# Patient Record
Sex: FEMALE | Race: WHITE | NOT HISPANIC OR LATINO | Employment: FULL TIME | URBAN - NONMETROPOLITAN AREA
[De-identification: names, ages, dates, MRNs, and addresses within clinical notes are randomized per-mention and may not be internally consistent; named-entity substitution may affect disease eponyms.]

---

## 2017-05-12 ENCOUNTER — OFFICE VISIT (OUTPATIENT)
Dept: SURGERY | Facility: CLINIC | Age: 32
End: 2017-05-12

## 2017-05-12 VITALS
BODY MASS INDEX: 33.32 KG/M2 | WEIGHT: 200 LBS | HEIGHT: 65 IN | DIASTOLIC BLOOD PRESSURE: 80 MMHG | SYSTOLIC BLOOD PRESSURE: 120 MMHG

## 2017-05-12 DIAGNOSIS — R92.8 ABNORMAL FINDING ON BREAST IMAGING: Primary | ICD-10-CM

## 2017-05-12 PROCEDURE — 99203 OFFICE O/P NEW LOW 30 MIN: CPT | Performed by: SURGERY

## 2017-05-12 RX ORDER — IBUPROFEN 800 MG/1
TABLET ORAL
Refills: 2 | COMMUNITY
Start: 2017-03-31 | End: 2019-02-28

## 2019-02-28 ENCOUNTER — HOSPITAL ENCOUNTER (EMERGENCY)
Facility: HOSPITAL | Age: 34
Discharge: HOME | End: 2019-02-28
Attending: EMERGENCY MEDICINE | Admitting: EMERGENCY MEDICINE

## 2019-02-28 ENCOUNTER — APPOINTMENT (OUTPATIENT)
Dept: ULTRASOUND IMAGING | Facility: HOSPITAL | Age: 34
End: 2019-02-28

## 2019-02-28 VITALS
WEIGHT: 200 LBS | OXYGEN SATURATION: 96 % | DIASTOLIC BLOOD PRESSURE: 74 MMHG | BODY MASS INDEX: 31.39 KG/M2 | TEMPERATURE: 98.2 F | RESPIRATION RATE: 18 BRPM | HEART RATE: 73 BPM | HEIGHT: 67 IN | SYSTOLIC BLOOD PRESSURE: 127 MMHG

## 2019-02-28 DIAGNOSIS — R10.2 SUPRAPUBIC PAIN: ICD-10-CM

## 2019-02-28 DIAGNOSIS — R93.89 ENDOMETRIAL THICKENING ON ULTRASOUND: Primary | ICD-10-CM

## 2019-02-28 DIAGNOSIS — N83.202 CYST OF LEFT OVARY: ICD-10-CM

## 2019-02-28 LAB
ALBUMIN SERPL-MCNC: 4.3 G/DL (ref 3.4–4.8)
ALBUMIN/GLOB SERPL: 1.3 G/DL (ref 1.1–1.8)
ALP SERPL-CCNC: 94 U/L (ref 38–126)
ALT SERPL W P-5'-P-CCNC: 20 U/L (ref 9–52)
ANION GAP SERPL CALCULATED.3IONS-SCNC: 11 MMOL/L (ref 5–15)
AST SERPL-CCNC: 22 U/L (ref 14–36)
B-HCG UR QL: NEGATIVE
BACTERIA UR QL AUTO: ABNORMAL /HPF
BASOPHILS # BLD AUTO: 0.06 10*3/MM3 (ref 0–0.2)
BASOPHILS NFR BLD AUTO: 0.5 % (ref 0–1.5)
BILIRUB SERPL-MCNC: 0.5 MG/DL (ref 0.2–1.3)
BILIRUB UR QL STRIP: NEGATIVE
BUN BLD-MCNC: 8 MG/DL (ref 7–21)
BUN/CREAT SERPL: 10.8 (ref 7–25)
CALCIUM SPEC-SCNC: 9.5 MG/DL (ref 8.4–10.2)
CHLORIDE SERPL-SCNC: 103 MMOL/L (ref 95–110)
CLARITY UR: ABNORMAL
CO2 SERPL-SCNC: 24 MMOL/L (ref 22–31)
COLOR UR: YELLOW
CREAT BLD-MCNC: 0.74 MG/DL (ref 0.5–1)
DEPRECATED RDW RBC AUTO: 36.5 FL (ref 37–54)
EOSINOPHIL # BLD AUTO: 0.16 10*3/MM3 (ref 0–0.4)
EOSINOPHIL NFR BLD AUTO: 1.4 % (ref 0.3–6.2)
ERYTHROCYTE [DISTWIDTH] IN BLOOD BY AUTOMATED COUNT: 12.5 % (ref 12.3–15.4)
GFR SERPL CREATININE-BSD FRML MDRD: 91 ML/MIN/1.73 (ref 64–149)
GLOBULIN UR ELPH-MCNC: 3.4 GM/DL (ref 2.3–3.5)
GLUCOSE BLD-MCNC: 95 MG/DL (ref 60–100)
GLUCOSE UR STRIP-MCNC: NEGATIVE MG/DL
HCT VFR BLD AUTO: 41.1 % (ref 34–46.6)
HGB BLD-MCNC: 14.5 G/DL (ref 12–15.9)
HGB UR QL STRIP.AUTO: ABNORMAL
HOLD SPECIMEN: NORMAL
HYALINE CASTS UR QL AUTO: ABNORMAL /LPF
IMM GRANULOCYTES # BLD AUTO: 0.06 10*3/MM3 (ref 0–0.05)
IMM GRANULOCYTES NFR BLD AUTO: 0.5 % (ref 0–0.5)
KETONES UR QL STRIP: NEGATIVE
LEUKOCYTE ESTERASE UR QL STRIP.AUTO: NEGATIVE
LYMPHOCYTES # BLD AUTO: 2.34 10*3/MM3 (ref 0.7–3.1)
LYMPHOCYTES NFR BLD AUTO: 20.2 % (ref 19.6–45.3)
MCH RBC QN AUTO: 28.6 PG (ref 26.6–33)
MCHC RBC AUTO-ENTMCNC: 35.3 G/DL (ref 31.5–35.7)
MCV RBC AUTO: 81.1 FL (ref 79–97)
MONOCYTES # BLD AUTO: 0.67 10*3/MM3 (ref 0.1–0.9)
MONOCYTES NFR BLD AUTO: 5.8 % (ref 5–12)
NEUTROPHILS # BLD AUTO: 8.29 10*3/MM3 (ref 1.4–7)
NEUTROPHILS NFR BLD AUTO: 71.6 % (ref 42.7–76)
NITRITE UR QL STRIP: NEGATIVE
NRBC BLD AUTO-RTO: 0 /100 WBC (ref 0–0)
PH UR STRIP.AUTO: 6.5 [PH] (ref 5–9)
PLATELET # BLD AUTO: 303 10*3/MM3 (ref 140–450)
PMV BLD AUTO: 9.6 FL (ref 6–12)
POTASSIUM BLD-SCNC: 3.7 MMOL/L (ref 3.5–5.1)
PROT SERPL-MCNC: 7.7 G/DL (ref 6.3–8.6)
PROT UR QL STRIP: ABNORMAL
RBC # BLD AUTO: 5.07 10*6/MM3 (ref 3.77–5.28)
RBC # UR: ABNORMAL /HPF
REF LAB TEST METHOD: ABNORMAL
SODIUM BLD-SCNC: 138 MMOL/L (ref 137–145)
SP GR UR STRIP: 1.01 (ref 1–1.03)
SQUAMOUS #/AREA URNS HPF: ABNORMAL /HPF
UROBILINOGEN UR QL STRIP: ABNORMAL
WBC NRBC COR # BLD: 11.58 10*3/MM3 (ref 3.4–10.8)
WBC UR QL AUTO: ABNORMAL /HPF
WHOLE BLOOD HOLD SPECIMEN: NORMAL

## 2019-02-28 PROCEDURE — 81001 URINALYSIS AUTO W/SCOPE: CPT | Performed by: PHYSICIAN ASSISTANT

## 2019-02-28 PROCEDURE — 80053 COMPREHEN METABOLIC PANEL: CPT | Performed by: PHYSICIAN ASSISTANT

## 2019-02-28 PROCEDURE — 81025 URINE PREGNANCY TEST: CPT | Performed by: PHYSICIAN ASSISTANT

## 2019-02-28 PROCEDURE — 99283 EMERGENCY DEPT VISIT LOW MDM: CPT

## 2019-02-28 PROCEDURE — 85025 COMPLETE CBC W/AUTO DIFF WBC: CPT | Performed by: PHYSICIAN ASSISTANT

## 2019-02-28 PROCEDURE — 96360 HYDRATION IV INFUSION INIT: CPT

## 2019-02-28 PROCEDURE — 76830 TRANSVAGINAL US NON-OB: CPT

## 2019-02-28 RX ORDER — KETOROLAC TROMETHAMINE 10 MG/1
10 TABLET, FILM COATED ORAL EVERY 6 HOURS PRN
Qty: 20 TABLET | Refills: 0 | Status: SHIPPED | OUTPATIENT
Start: 2019-02-28 | End: 2019-03-05

## 2019-02-28 RX ORDER — KETOROLAC TROMETHAMINE 10 MG/1
20 TABLET, FILM COATED ORAL ONCE
Status: COMPLETED | OUTPATIENT
Start: 2019-02-28 | End: 2019-02-28

## 2019-02-28 RX ADMIN — KETOROLAC TROMETHAMINE 20 MG: 10 TABLET, FILM COATED ORAL at 10:32

## 2019-02-28 RX ADMIN — SODIUM CHLORIDE 1000 ML: 900 INJECTION, SOLUTION INTRAVENOUS at 10:32

## 2019-03-11 ENCOUNTER — OFFICE VISIT (OUTPATIENT)
Dept: OBSTETRICS AND GYNECOLOGY | Facility: CLINIC | Age: 34
End: 2019-03-11

## 2019-03-11 VITALS
HEIGHT: 64 IN | BODY MASS INDEX: 43.74 KG/M2 | WEIGHT: 256.2 LBS | SYSTOLIC BLOOD PRESSURE: 128 MMHG | DIASTOLIC BLOOD PRESSURE: 84 MMHG

## 2019-03-11 DIAGNOSIS — R93.89 ABNORMAL PELVIC ULTRASOUND: ICD-10-CM

## 2019-03-11 DIAGNOSIS — E66.01 MORBID OBESITY (HCC): ICD-10-CM

## 2019-03-11 DIAGNOSIS — N92.0 MENORRHAGIA WITH REGULAR CYCLE: Primary | ICD-10-CM

## 2019-03-11 DIAGNOSIS — Z98.890 HISTORY OF ENDOMETRIAL ABLATION: ICD-10-CM

## 2019-03-11 PROCEDURE — 99213 OFFICE O/P EST LOW 20 MIN: CPT | Performed by: OBSTETRICS & GYNECOLOGY

## 2019-03-11 NOTE — PROGRESS NOTES
Chief Complaint   Patient presents with   • Follow-up     emergency room follow up     Narda Sheehan is a 33 y.o. year old No obstetric history on file..  Patient's last menstrual period was 02/25/2019 (exact date).  She presents with a chief complaint of follow up from ER secondary to abnormal pelvic ultrasound showing . Patient went to ER for indication of Left suprapubic pain which has resolved spontaneously.  Patient states she has never been to see a PCP but she did have hypertension and issues with a rapid heart rate.    US Non-ob Transvaginal [[id number]] (Order [id number])   Order   Status: Final result   Study Result     EXAMINATION:  Ultrasound, pelvic     CLINICAL INDICATION / HISTORY:  LEFT SUPRAPUBIC PAIN     COMPARISON:  none     TECHNIQUE:  Transvaginal     FULL RESULTS / FINDINGS:        Transvaginal:    - uterus: normal size, measures 9.18 x 5.13 x 5.92 cm  Uterine fundus oval anechoic structures with good through  acoustic transmission which measures 5.7 mm in greatest diameter.                   endometrial canal: In the region of the cervix,  there is a small focus of endometrial thickening and  hypoechogenicity which measures 0.89 x 0.56 cm. Otherwise the  endometrium is unremarkable with maximum width of 0.6 cm.    - ovaries:        - right:  normal size, measuring 3.1 x 1.7 x 2.2 cm                    echotexture:   physiologic , no complex / solid  mass(es), normal color-flow blood flow        - left:    normal size, measuring 2.6 x 2.2 x 1.9 cm                     echotexture:   physiologic , no complex /  solid mass(es), normal color-flow blood flow    - cul-de-sac: no/physiologic amount of free fluid     Misc:     IMPRESSION:  CONCLUSION:    1.  In the region of the cervix, there is a small focus of  endometrial thickening and hypoechogenicity which measures 0.89 x  0.56 cm. This is of uncertain etiology. This may represent normal  endometrial changes from menstrual cycle versus retained  "products  of conception versus intrauterine blood clot. Less likely  etiologies could be early cervical carcinoma. Recommend GYN  consultation and consideration of biopsy.  2.  Uterine fundus oval anechoic structures with good through  acoustic transmission which measures 5.7 mm in greatest diameter  consistent with small cyst in this region. This would be  suspicious for adenomyosis.  3.  Otherwise unremarkable pelvic ultrasound.     Electronically signed by:  Shankar Amaya MD  2019 10:57 AM CST  Workstation: [id number]                                                             l                                        Past Medical History:   Diagnosis Date   • Acute pain    • Acute pharyngitis    • Aphthous ulcer of mouth    • Glossitis    • Headache    • Streptococcal sore throat    • Tension-type headache    • Upper respiratory infection      PSH:  x 2, tubal ligation with last . Endometrial ablation in 2013  No current outpatient medications on file.  No Known Allergies  Social History    Tobacco Use      Smoking status: Never Smoker      Smokeless tobacco: Never Used    Review of Systems  Negative except for heavy monthly menses with several days of pelvic pain at end of menses and a few days afterwards x 2 months.    /84   Ht 162.6 cm (64\")   Wt 116 kg (256 lb 3.2 oz)   LMP 2019 (Exact Date)   BMI 43.98 kg/m²     General:  well developed; well nourished  no acute distress  appears stated age  obese - Body mass index is 43.98 kg/m².   Thyroid: not examined   Lungs:  breathing is unlabored   Heart:  Not performed.   Breasts:  Not performed.   Abdomen: soft, non-tender; no masses   Pelvis: Not performed.     Lab Review   No data reviewed      Imaging   Pelvic ultrasound report    Assessment     1. Morbid obesity  2.  History Chronic hypertension, with no medical followup  3.  History of episodes of rapid heart rate, no medical followup  4.  Menorrhagia  5.  Abnormal endometrial " and cervical canal linings; ? polyps       Plan   1. Medical clearance for surgery  2. Schedule for hysteroscopic D&C with cervical curettage  3. The options presented to the patient were:Consider a diagnostic hysteroscopy and Consider a D&C, no treatment.  4. Patient to return to clinic with me for pre-op appointment and labs for menorrhagia workup.         This note was electronically signed.    Evita Dominique MD  March 11, 2019

## 2019-03-12 DIAGNOSIS — R00.0 RAPID HEART RATE: Primary | ICD-10-CM

## 2019-03-20 ENCOUNTER — OFFICE VISIT (OUTPATIENT)
Dept: FAMILY MEDICINE CLINIC | Facility: CLINIC | Age: 34
End: 2019-03-20

## 2019-03-20 VITALS
OXYGEN SATURATION: 98 % | SYSTOLIC BLOOD PRESSURE: 140 MMHG | BODY MASS INDEX: 43.6 KG/M2 | HEIGHT: 64 IN | DIASTOLIC BLOOD PRESSURE: 90 MMHG | WEIGHT: 255.4 LBS | TEMPERATURE: 98.9 F | HEART RATE: 79 BPM

## 2019-03-20 DIAGNOSIS — Z00.00 ENCOUNTER FOR SCREENING AND PREVENTATIVE CARE: ICD-10-CM

## 2019-03-20 DIAGNOSIS — R03.0 ELEVATED BP WITHOUT DIAGNOSIS OF HYPERTENSION: ICD-10-CM

## 2019-03-20 DIAGNOSIS — E66.01 CLASS 3 SEVERE OBESITY WITH BODY MASS INDEX (BMI) OF 40.0 TO 44.9 IN ADULT, UNSPECIFIED OBESITY TYPE, UNSPECIFIED WHETHER SERIOUS COMORBIDITY PRESENT (HCC): ICD-10-CM

## 2019-03-20 DIAGNOSIS — Z76.89 ENCOUNTER TO ESTABLISH CARE: Primary | ICD-10-CM

## 2019-03-20 PROCEDURE — 99203 OFFICE O/P NEW LOW 30 MIN: CPT | Performed by: NURSE PRACTITIONER

## 2019-03-20 NOTE — PROGRESS NOTES
Subjective   Narda Sheehan is a 33 y.o. female.     Ms. Sheehan is a 33-year-old female presents today to establish care for the management of obesity and history of elevated blood pressure.  She denies chest pain, shortness of breath palpitations.  She states she has had intermittent blood pressure in the past but in the last 6 months her blood pressure has been consistently less than 140/90.  She states she has had to see a cardiologist when she was a teenager related to tachycardia.  She has had no follow-up since that time.  She has not been on any blood pressure medication.  She is scheduled for a cardiology evaluation so that she can be cleared for an upcoming OB/GYN procedure.  She has no acute complaints today.         The following portions of the patient's history were reviewed and updated as appropriate: allergies, current medications, past family history, past medical history, past social history, past surgical history and problem list.    Review of Systems   Constitutional: Negative for activity change, appetite change, chills, diaphoresis, fatigue, fever, unexpected weight gain and unexpected weight loss.   HENT: Negative for congestion, sore throat, trouble swallowing and voice change.    Eyes: Negative for blurred vision, double vision, photophobia, pain and visual disturbance.   Respiratory: Negative for cough, chest tightness, shortness of breath and wheezing.    Cardiovascular: Negative for chest pain, palpitations and leg swelling.   Gastrointestinal: Negative for abdominal distention, abdominal pain, anal bleeding, blood in stool, constipation, diarrhea, nausea, vomiting, GERD and indigestion.   Endocrine: Negative for cold intolerance, heat intolerance, polydipsia, polyphagia and polyuria.   Genitourinary: Negative for dysuria, frequency, hematuria and urgency.   Musculoskeletal: Negative for arthralgias and myalgias.   Skin: Negative for rash.   Allergic/Immunologic: Negative.    Neurological:  Negative for dizziness, syncope, weakness, light-headedness and headache.   Hematological: Negative.    Psychiatric/Behavioral: The patient is not nervous/anxious.        Objective   Physical Exam   Constitutional: She is oriented to person, place, and time. She appears well-developed and well-nourished. No distress.   HENT:   Head: Normocephalic and atraumatic.   Right Ear: External ear normal.   Left Ear: External ear normal.   Nose: Nose normal.   Mouth/Throat: Oropharynx is clear and moist.   Eyes: Conjunctivae and EOM are normal. Pupils are equal, round, and reactive to light.   Neck: Normal range of motion. Neck supple. No tracheal deviation present. No thyromegaly present.   Cardiovascular: Normal rate, regular rhythm, normal heart sounds and intact distal pulses. Exam reveals no gallop and no friction rub.   No murmur heard.  Pulmonary/Chest: Effort normal and breath sounds normal. No stridor. No respiratory distress. She has no wheezes. She has no rales.   Abdominal: Soft. Bowel sounds are normal. She exhibits no distension and no mass. There is no tenderness. There is no rebound and no guarding. No hernia.   Musculoskeletal: Normal range of motion. She exhibits no edema or tenderness.   Lymphadenopathy:     She has no cervical adenopathy.   Neurological: She is alert and oriented to person, place, and time. No cranial nerve deficit. Coordination normal.   Skin: Skin is warm and dry. No rash noted. She is not diaphoretic. No erythema. No pallor.   Psychiatric: She has a normal mood and affect. Her behavior is normal. Judgment and thought content normal.   Nursing note and vitals reviewed.        Assessment/Plan   Narda was seen today for establish care.    Diagnoses and all orders for this visit:    Encounter to establish care    Encounter for screening and preventative care  -     Hemoglobin A1c; Future  -     Comprehensive Metabolic Panel; Future  -     CBC & Differential; Future  -     TSH; Future  -      Lipid Panel; Future    Elevated BP without diagnosis of hypertension    Class 3 severe obesity with body mass index (BMI) of 40.0 to 44.9 in adult, unspecified obesity type, unspecified whether serious comorbidity present (CMS/[facility])    1.  Elevated BP without a diagnosis of hypertension-blood pressure today 140/90.  She has no chest pain, shortness of breath or palpitations.  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  Instructed patient to continue to monitor home blood pressure readings and log.  She was instructed to bring these readings to her cardiology appointment.  Will defer any possible medication until she has been evaluated by cardiology.    2.  Obesity-discussed low-carb high-protein diet and exercise 3-5 times a week for 30 minutes.  Patient is also interested in possible medication to assist in weight loss.  We both agreed that this would probably be best addressed after she had her OB/GYN procedure and fully recovered.  She is in agreement with this plan of care.  More than 10 minutes spent counseling on diet and exercise.    3.  Health maintenance-routine labs ordered.  Will call with results.    4.  Follow-up in 3 months or sooner if needed.            This document has been electronically signed by LOR Jerome on March 20, 2019 12:28 PM

## 2019-03-22 ENCOUNTER — LAB (OUTPATIENT)
Dept: LAB | Facility: HOSPITAL | Age: 34
End: 2019-03-22

## 2019-03-22 DIAGNOSIS — Z00.00 ENCOUNTER FOR SCREENING AND PREVENTATIVE CARE: ICD-10-CM

## 2019-03-22 LAB
ALBUMIN SERPL-MCNC: 4.1 G/DL (ref 3.4–4.8)
ALBUMIN/GLOB SERPL: 1.2 G/DL (ref 1.1–1.8)
ALP SERPL-CCNC: 74 U/L (ref 38–126)
ALT SERPL W P-5'-P-CCNC: 25 U/L (ref 9–52)
ANION GAP SERPL CALCULATED.3IONS-SCNC: 9 MMOL/L (ref 5–15)
ARTICHOKE IGE QN: 136 MG/DL (ref 1–129)
AST SERPL-CCNC: 45 U/L (ref 14–36)
BASOPHILS # BLD AUTO: 0.07 10*3/MM3 (ref 0–0.2)
BASOPHILS NFR BLD AUTO: 0.7 % (ref 0–1.5)
BILIRUB SERPL-MCNC: 0.5 MG/DL (ref 0.2–1.3)
BUN BLD-MCNC: 12 MG/DL (ref 7–21)
BUN/CREAT SERPL: 16.4 (ref 7–25)
CALCIUM SPEC-SCNC: 9.2 MG/DL (ref 8.4–10.2)
CHLORIDE SERPL-SCNC: 101 MMOL/L (ref 95–110)
CHOLEST SERPL-MCNC: 221 MG/DL (ref 0–199)
CO2 SERPL-SCNC: 27 MMOL/L (ref 22–31)
CREAT BLD-MCNC: 0.73 MG/DL (ref 0.5–1)
DEPRECATED RDW RBC AUTO: 37.6 FL (ref 37–54)
EOSINOPHIL # BLD AUTO: 0.24 10*3/MM3 (ref 0–0.4)
EOSINOPHIL NFR BLD AUTO: 2.4 % (ref 0.3–6.2)
ERYTHROCYTE [DISTWIDTH] IN BLOOD BY AUTOMATED COUNT: 12.8 % (ref 12.3–15.4)
GFR SERPL CREATININE-BSD FRML MDRD: 92 ML/MIN/1.73 (ref 64–149)
GLOBULIN UR ELPH-MCNC: 3.5 GM/DL (ref 2.3–3.5)
GLUCOSE BLD-MCNC: 93 MG/DL (ref 60–100)
HBA1C MFR BLD: 5.5 % (ref 4–5.6)
HCT VFR BLD AUTO: 42 % (ref 34–46.6)
HDLC SERPL-MCNC: 38 MG/DL (ref 60–200)
HGB BLD-MCNC: 14.2 G/DL (ref 12–15.9)
IMM GRANULOCYTES # BLD AUTO: 0.07 10*3/MM3 (ref 0–0.05)
IMM GRANULOCYTES NFR BLD AUTO: 0.7 % (ref 0–0.5)
LDLC/HDLC SERPL: 3.81 {RATIO} (ref 0–3.22)
LYMPHOCYTES # BLD AUTO: 3.51 10*3/MM3 (ref 0.7–3.1)
LYMPHOCYTES NFR BLD AUTO: 34.5 % (ref 19.6–45.3)
MCH RBC QN AUTO: 27.8 PG (ref 26.6–33)
MCHC RBC AUTO-ENTMCNC: 33.8 G/DL (ref 31.5–35.7)
MCV RBC AUTO: 82.2 FL (ref 79–97)
MONOCYTES # BLD AUTO: 0.68 10*3/MM3 (ref 0.1–0.9)
MONOCYTES NFR BLD AUTO: 6.7 % (ref 5–12)
NEUTROPHILS # BLD AUTO: 5.61 10*3/MM3 (ref 1.4–7)
NEUTROPHILS NFR BLD AUTO: 55 % (ref 42.7–76)
NRBC BLD AUTO-RTO: 0 /100 WBC (ref 0–0)
PLATELET # BLD AUTO: 301 10*3/MM3 (ref 140–450)
PMV BLD AUTO: 9.8 FL (ref 6–12)
POTASSIUM BLD-SCNC: 3.9 MMOL/L (ref 3.5–5.1)
PROT SERPL-MCNC: 7.6 G/DL (ref 6.3–8.6)
RBC # BLD AUTO: 5.11 10*6/MM3 (ref 3.77–5.28)
SODIUM BLD-SCNC: 137 MMOL/L (ref 137–145)
TRIGL SERPL-MCNC: 191 MG/DL (ref 20–199)
TSH SERPL DL<=0.05 MIU/L-ACNC: 1.62 MIU/ML (ref 0.46–4.68)
WBC NRBC COR # BLD: 10.18 10*3/MM3 (ref 3.4–10.8)

## 2019-03-22 PROCEDURE — 84443 ASSAY THYROID STIM HORMONE: CPT

## 2019-03-22 PROCEDURE — 80053 COMPREHEN METABOLIC PANEL: CPT

## 2019-03-22 PROCEDURE — 80061 LIPID PANEL: CPT

## 2019-03-22 PROCEDURE — 85025 COMPLETE CBC W/AUTO DIFF WBC: CPT

## 2019-03-22 PROCEDURE — 83036 HEMOGLOBIN GLYCOSYLATED A1C: CPT

## 2019-03-22 PROCEDURE — 36415 COLL VENOUS BLD VENIPUNCTURE: CPT

## 2019-03-22 NOTE — PROGRESS NOTES
Total cholesterol and LDL slightly elevated.  HDL slightly low.  Instruct in low-fat diet and exercise.  Follow-up as scheduled.

## 2019-04-08 DIAGNOSIS — R00.0 RAPID HEART RATE: Primary | ICD-10-CM

## 2019-04-09 ENCOUNTER — OFFICE VISIT (OUTPATIENT)
Dept: CARDIOLOGY | Facility: CLINIC | Age: 34
End: 2019-04-09

## 2019-04-09 VITALS
HEIGHT: 64 IN | BODY MASS INDEX: 43.54 KG/M2 | HEART RATE: 80 BPM | SYSTOLIC BLOOD PRESSURE: 132 MMHG | WEIGHT: 255 LBS | DIASTOLIC BLOOD PRESSURE: 78 MMHG

## 2019-04-09 DIAGNOSIS — I34.1 MVP (MITRAL VALVE PROLAPSE): ICD-10-CM

## 2019-04-09 DIAGNOSIS — R00.2 PALPITATIONS: ICD-10-CM

## 2019-04-09 DIAGNOSIS — Z01.810 PREOP CARDIOVASCULAR EXAM: Primary | ICD-10-CM

## 2019-04-09 PROCEDURE — 93000 ELECTROCARDIOGRAM COMPLETE: CPT | Performed by: INTERNAL MEDICINE

## 2019-04-09 PROCEDURE — 99204 OFFICE O/P NEW MOD 45 MIN: CPT | Performed by: INTERNAL MEDICINE

## 2019-04-09 NOTE — PROGRESS NOTES
Narda Sheehan  33 y.o. female    2019  1. Preop cardiovascular exam    2. Palpitations    3.     Mitral valve prolapse    History of Present Illness:     Patient's Body mass index is 43.77 kg/m². BMI is above normal parameters. Recommendations include: exercise counseling, nutrition counseling and referral to primary care   .  33 years old patient who had palpitation with a heart rate 120-130 bpm more than 6 months ago evaluate at that time with Dr. Hennessy with echocardiographic study with a history of mitral valve prolapse.  Echo reported ejection fractions 50-55% mild mitral valve prolapse with mitral valve thickening with a trace mitral and mild tricuspid regurgitation.  No further palpitation reported.  She is physically active and walk almost every day more than 2-3 mile without symptom of dizziness shortness of breath chest pain or claudications.  She is referred for preop evaluation as she is scheduled to have dilatation and curette for some uterine pathology showed a family history of cancer.  No chest pain reported.  Fortunately she does not smoke        SUBJECTIVE:    No Known Allergies      Past Medical History:   Diagnosis Date   • Acute pain    • Acute pharyngitis    • Aphthous ulcer of mouth    • Glossitis    • Hyperlipidemia    • Streptococcal sore throat    • Upper respiratory infection          Past Surgical History:   Procedure Laterality Date   •  SECTION     • ENDOMETRIAL ABLATION     • TUBAL ABDOMINAL LIGATION           Family History   Problem Relation Age of Onset   • Cancer Mother    • Leukemia Mother    • Hypertension Father    • Multiple sclerosis Father    • No Known Problems Brother    • Asthma Daughter    • Arnold-Chiari malformation Son    • No Known Problems Maternal Grandmother    • No Known Problems Maternal Grandfather          Social History     Socioeconomic History   • Marital status:      Spouse name: Not on file   • Number of children: Not on file   •  "Years of education: Not on file   • Highest education level: Not on file   Tobacco Use   • Smoking status: Never Smoker   • Smokeless tobacco: Never Used   Substance and Sexual Activity   • Alcohol use: No   • Drug use: No   • Sexual activity: Yes     Partners: Male     Birth control/protection: Surgical         No current outpatient medications on file.     No current facility-administered medications for this visit.            Review of Systems:     Constitutional:  Denies recent weight loss, weight gain, fever or chills, no change in exercise tolerance.     HENT:  Denies any hearing loss, epistaxis, hoarseness, or difficulty speaking.     Eyes: No blurring    Respiratory:  Denies dyspnea with exertion,no cough, wheezing, or hemoptysis.     Cardiovascular: See Hand P    Gastrointestinal:  Denies change in bowel habits, dyspepsia, ulcer disease, hematochezia, or melena.     Endocrine: Negative for cold intolerance, heat intolerance, polydipsia, polyphagia and polyuria. Denies any history of weight change, polydipsia, polyuria.     Genitourinary: Negative.      Musculoskeletal: Denies any history of arthritic symptoms or back problems.     Skin:  Denies any change in hair or nails, rashes, or skin lesions.     Allergic/Immunologic: Negative.  Negative for environmental allergies, food allergies and immunocompromised state.     Neurological:  Denies any history of recurrent headaches, strokes, TIA, or seizure disorder.     Hematological: Denies any food allergies, seasonal allergies, bleeding disorders, or lymphadenopathy.     Psychiatric/Behavioral: Denies any history of depression, substance abuse, or change in cognitive function.       OBJECTIVE:    /78   Pulse 80   Ht 162.6 cm (64\")   Wt 116 kg (255 lb)   BMI 43.77 kg/m²       Physical Exam:     Constitutional: Cooperative, alert and oriented, well-developed, well-nourished, in no acute distress.     HENT:   Head: Normocephalic, normal hair patterns, no " masses or tenderness.  Ears, Nose, and Throat: No gross abnormalities. No pallor or cyanosis. Dentition good.   Eyes: EOMS intact, PERRL, conjunctivae and lids unremarkable. Fundoscopic exam and visual fields not performed.   Neck: No palpable masses or adenopathy, no thyromegaly, no JVD, carotid pulses are full and equal bilaterally and without  Bruits.     Cardiovascular: Regular rhythm, S1 and S2 normal, no S3 or S4. Apical impulse not displaced. No murmurs, gallops, or rubs detected.     Pulmonary/Chest: Chest: normal symmetry, no tenderness to palpation, normal respiratory excursion, no intercostal retraction, no use of accessory muscles. Pulmonary: Normal breath sounds. No rales or rhonchi.    Abdominal: Abdomen soft, bowel sounds normoactive, no masses, no hepatosplenomegaly, non-tender, no bruits.     Musculoskeletal: No deformities, clubbing, cyanosis, erythema, or edema observed. There are no spinal abnormalities noted. Normal muscle strength and tone. Pulses full and equal in all extremities, no bruits auscultated.     Neurological: No gross motor or sensory deficits noted, affect appropriate, oriented to time, person, place.     Skin: Warm and dry to the touch, no apparent skin lesions or masses noted.     Psychiatric: She has a normal mood and affect. Her behavior is normal. Judgment and thought content normal.         Procedures      Lab Results   Component Value Date    WBC 10.18 03/22/2019    HGB 14.2 03/22/2019    HCT 42.0 03/22/2019    MCV 82.2 03/22/2019     03/22/2019     Lab Results   Component Value Date    GLUCOSE 93 03/22/2019    BUN 12 03/22/2019    CREATININE 0.73 03/22/2019    EGFRIFNONA 92 03/22/2019    BCR 16.4 03/22/2019    CO2 27.0 03/22/2019    CALCIUM 9.2 03/22/2019    ALBUMIN 4.10 03/22/2019    AST 45 (H) 03/22/2019    ALT 25 03/22/2019     Lab Results   Component Value Date    CHOL 221 (H) 03/22/2019     Lab Results   Component Value Date    TRIG 191 03/22/2019     Lab  Results   Component Value Date    HDL 38 (L) 03/22/2019     No components found for: LDLCALC  Lab Results   Component Value Date     (H) 03/22/2019     No results found for: HDLLDLRATIO  No components found for: CHOLHDL  Lab Results   Component Value Date    HGBA1C 5.5 03/22/2019     Lab Results   Component Value Date    TSH 1.620 03/22/2019           ASSESSMENT AND PLAN:  #1 preop evaluation #2 mitral valve prolapse with trace mitral and mild tricuspid regurgitation #3 palpitation and tachycardia with rate up to 120-130 bpm more than 5-month no further recurrence    Clinically, no sign of cardiac decompensation or ischemia at the time of evaluation and given the [facility] cardiac status with mets achieve more than 5 to 6 no furthe risk stratification need prior to the procedure.No further recurrence of palpitation and no further evaluation needed at this stage. Patient is low to moderaet risk. Given her BMP significance of low carb/low fat diet dischssjose    Narda was seen today for new patient.    Diagnoses and all orders for this visit:    Preop cardiovascular exam    Palpitations        Raul Gloria MD  4/9/2019  2:04 PM

## 2019-04-10 ENCOUNTER — TELEPHONE (OUTPATIENT)
Dept: OBSTETRICS AND GYNECOLOGY | Facility: CLINIC | Age: 34
End: 2019-04-10

## 2019-04-17 ENCOUNTER — OFFICE VISIT (OUTPATIENT)
Dept: OBSTETRICS AND GYNECOLOGY | Facility: CLINIC | Age: 34
End: 2019-04-17

## 2019-04-17 VITALS
BODY MASS INDEX: 43.36 KG/M2 | DIASTOLIC BLOOD PRESSURE: 99 MMHG | SYSTOLIC BLOOD PRESSURE: 149 MMHG | HEIGHT: 64 IN | WEIGHT: 254 LBS

## 2019-04-17 DIAGNOSIS — R93.89 ABNORMAL PELVIC ULTRASOUND: Primary | ICD-10-CM

## 2019-04-17 DIAGNOSIS — Z01.818 PREOP EXAMINATION: ICD-10-CM

## 2019-04-17 PROCEDURE — 99213 OFFICE O/P EST LOW 20 MIN: CPT | Performed by: OBSTETRICS & GYNECOLOGY

## 2019-04-17 RX ORDER — SODIUM CHLORIDE 0.9 % (FLUSH) 0.9 %
3 SYRINGE (ML) INJECTION EVERY 12 HOURS SCHEDULED
Status: CANCELLED | OUTPATIENT
Start: 2019-04-19

## 2019-04-17 RX ORDER — BUPIVACAINE HCL/0.9 % NACL/PF 0.1 %
2 PLASTIC BAG, INJECTION (ML) EPIDURAL
Status: CANCELLED | OUTPATIENT
Start: 2019-04-19 | End: 2019-04-20

## 2019-04-17 RX ORDER — SODIUM CHLORIDE 0.9 % (FLUSH) 0.9 %
3-10 SYRINGE (ML) INJECTION AS NEEDED
Status: CANCELLED | OUTPATIENT
Start: 2019-04-19

## 2019-04-17 NOTE — H&P (VIEW-ONLY)
Chief Complaint   Patient presents with   • Gynecologic Exam     Narda Sheehan is a 33 y.o. year old  ( x 2).  Patient's last menstrual period was 2019.  She presents for her pre-operative evaluation for evaluation of abnormal pelvic ultrasound:    Transvaginal:    - uterus: normal size, measures 9.18 x 5.13 x 5.92 cm  Uterine fundus oval anechoic structures with good through  acoustic transmission which measures 5.7 mm in greatest diameter.                   endometrial canal: In the region of the cervix,  there is a small focus of endometrial thickening and  hypoechogenicity which measures 0.89 x 0.56 cm. Otherwise the  endometrium is unremarkable with maximum width of 0.6 cm.    - ovaries:        - right:  normal size, measuring 3.1 x 1.7 x 2.2 cm                    echotexture:   physiologic , no complex / solid  mass(es), normal color-flow blood flow        - left:    normal size, measuring 2.6 x 2.2 x 1.9 cm                     echotexture:   physiologic , no complex /  solid mass(es), normal color-flow blood flow    - cul-de-sac: no/physiologic amount of free fluid     Misc:     IMPRESSION:  CONCLUSION:    1.  In the region of the cervix, there is a small focus of  endometrial thickening and hypoechogenicity which measures 0.89 x  0.56 cm. This is of uncertain etiology. This may represent normal  endometrial changes from menstrual cycle versus retained products  of conception versus intrauterine blood clot. Less likely  etiologies could be early cervical carcinoma. Recommend GYN  consultation and consideration of biopsy.  2.  Uterine fundus oval anechoic structures with good through  acoustic transmission which measures 5.7 mm in greatest diameter  consistent with small cyst in this region. This would be  suspicious for adenomyosis.  3.  Otherwise unremarkable pelvic ultrasound.     Electronically signed by:  Shankar Amaya MD  2019 10:57 AM CST  Workstation: [id number]            "    Patient had pre-operative evaluation by cardiology who cleared patient for surgery.    Discussed with patient the nature of the surgery including infection, bleeding, uterine perforation, damage to internal organs, possible need for laparoscopy or life-saving hysterectomy, anesthesia reaction, need for additional surgery.      Past Medical History:   Diagnosis Date   • Acute pain    • Acute pharyngitis    • Aphthous ulcer of mouth    • Glossitis    • Hyperlipidemia    • Streptococcal sore throat    • Upper respiratory infection      Past Surgical History:   Procedure Laterality Date   •  SECTION     • ENDOMETRIAL ABLATION     • TUBAL ABDOMINAL LIGATION       No current outpatient medications on file.  No Known Allergies  Social History    Tobacco Use      Smoking status: Never Smoker      Smokeless tobacco: Never Used    Review of Systems negative for all systems except for irregular bleeding and recent left suprapubic pain    /99   Ht 162.6 cm (64\")   Wt 115 kg (254 lb)   LMP 2019   BMI 43.60 kg/m²     General:  well developed; well nourished  no acute distress  appears stated age   Thyroid: not examined   Lungs:  breathing is unlabored   Heart:  regular rate and rhythm  S1, S2 normal   Breasts:  Not performed.   Abdomen: soft, non-tender; no masses   Pelvis: Not performed.     Lab Review   cardiology evaluation reviewed      Imaging   Pelvic ultrasound report    Assessment      Diagnosis Plan   1. Abnormal pelvic ultrasound  Case Request    sodium chloride 0.9 % flush 3 mL    sodium chloride 0.9 % flush 3-10 mL    CBC and Differential    Comprehensive Metabolic Panel    Pregnancy, Urine - Urine, Clean Catch    Type & Screen    ceFAZolin (ANCEF) 2 g in sodium chloride 0.9 % 100 mL IVPB    Case Request   2. Preop examination            Plan   1. Plan is for hysteroscopy, D&C, cervical curettage on 2019  2. Verbal consent obtained  3. The options presented to the patient " were:Doing nothing is not recommended, Consider a diagnostic hysteroscopy and Consider a D&C.  Will repeat pap if cannot find patient's results from recent pap         This note was electronically signed.    Evita Dominique MD  April 17, 2019

## 2019-04-17 NOTE — PROGRESS NOTES
Chief Complaint   Patient presents with   • Gynecologic Exam     Narda Sheehan is a 33 y.o. year old  ( x 2).  Patient's last menstrual period was 2019.  She presents for her pre-operative evaluation for evaluation of abnormal pelvic ultrasound:    Transvaginal:    - uterus: normal size, measures 9.18 x 5.13 x 5.92 cm  Uterine fundus oval anechoic structures with good through  acoustic transmission which measures 5.7 mm in greatest diameter.                   endometrial canal: In the region of the cervix,  there is a small focus of endometrial thickening and  hypoechogenicity which measures 0.89 x 0.56 cm. Otherwise the  endometrium is unremarkable with maximum width of 0.6 cm.    - ovaries:        - right:  normal size, measuring 3.1 x 1.7 x 2.2 cm                    echotexture:   physiologic , no complex / solid  mass(es), normal color-flow blood flow        - left:    normal size, measuring 2.6 x 2.2 x 1.9 cm                     echotexture:   physiologic , no complex /  solid mass(es), normal color-flow blood flow    - cul-de-sac: no/physiologic amount of free fluid     Misc:     IMPRESSION:  CONCLUSION:    1.  In the region of the cervix, there is a small focus of  endometrial thickening and hypoechogenicity which measures 0.89 x  0.56 cm. This is of uncertain etiology. This may represent normal  endometrial changes from menstrual cycle versus retained products  of conception versus intrauterine blood clot. Less likely  etiologies could be early cervical carcinoma. Recommend GYN  consultation and consideration of biopsy.  2.  Uterine fundus oval anechoic structures with good through  acoustic transmission which measures 5.7 mm in greatest diameter  consistent with small cyst in this region. This would be  suspicious for adenomyosis.  3.  Otherwise unremarkable pelvic ultrasound.     Electronically signed by:  Shankar Amaya MD  2019 10:57 AM CST  Workstation: [id number]            "    Patient had pre-operative evaluation by cardiology who cleared patient for surgery.    Discussed with patient the nature of the surgery including infection, bleeding, uterine perforation, damage to internal organs, possible need for laparoscopy or life-saving hysterectomy, anesthesia reaction, need for additional surgery.      Past Medical History:   Diagnosis Date   • Acute pain    • Acute pharyngitis    • Aphthous ulcer of mouth    • Glossitis    • Hyperlipidemia    • Streptococcal sore throat    • Upper respiratory infection      Past Surgical History:   Procedure Laterality Date   •  SECTION     • ENDOMETRIAL ABLATION     • TUBAL ABDOMINAL LIGATION       No current outpatient medications on file.  No Known Allergies  Social History    Tobacco Use      Smoking status: Never Smoker      Smokeless tobacco: Never Used    Review of Systems negative for all systems except for irregular bleeding and recent left suprapubic pain    /99   Ht 162.6 cm (64\")   Wt 115 kg (254 lb)   LMP 2019   BMI 43.60 kg/m²     General:  well developed; well nourished  no acute distress  appears stated age   Thyroid: not examined   Lungs:  breathing is unlabored   Heart:  regular rate and rhythm  S1, S2 normal   Breasts:  Not performed.   Abdomen: soft, non-tender; no masses   Pelvis: Not performed.     Lab Review   cardiology evaluation reviewed      Imaging   Pelvic ultrasound report    Assessment      Diagnosis Plan   1. Abnormal pelvic ultrasound  Case Request    sodium chloride 0.9 % flush 3 mL    sodium chloride 0.9 % flush 3-10 mL    CBC and Differential    Comprehensive Metabolic Panel    Pregnancy, Urine - Urine, Clean Catch    Type & Screen    ceFAZolin (ANCEF) 2 g in sodium chloride 0.9 % 100 mL IVPB    Case Request   2. Preop examination            Plan   1. Plan is for hysteroscopy, D&C, cervical curettage on 2019  2. Verbal consent obtained  3. The options presented to the patient " were:Doing nothing is not recommended, Consider a diagnostic hysteroscopy and Consider a D&C.  Will repeat pap if cannot find patient's results from recent pap         This note was electronically signed.    Evita Dominique MD  April 17, 2019

## 2019-04-18 ENCOUNTER — APPOINTMENT (OUTPATIENT)
Dept: PREADMISSION TESTING | Facility: HOSPITAL | Age: 34
End: 2019-04-18

## 2019-04-18 ENCOUNTER — ANESTHESIA EVENT (OUTPATIENT)
Dept: PERIOP | Facility: HOSPITAL | Age: 34
End: 2019-04-18

## 2019-04-18 VITALS
HEART RATE: 80 BPM | OXYGEN SATURATION: 97 % | SYSTOLIC BLOOD PRESSURE: 149 MMHG | BODY MASS INDEX: 43.36 KG/M2 | HEIGHT: 64 IN | WEIGHT: 254 LBS | DIASTOLIC BLOOD PRESSURE: 94 MMHG | RESPIRATION RATE: 16 BRPM

## 2019-04-18 DIAGNOSIS — R93.89 ABNORMAL PELVIC ULTRASOUND: ICD-10-CM

## 2019-04-18 LAB
ABO GROUP BLD: NORMAL
ALBUMIN SERPL-MCNC: 4.1 G/DL (ref 3.5–5.2)
ALBUMIN/GLOB SERPL: 1.4 G/DL
ALP SERPL-CCNC: 87 U/L (ref 39–117)
ALT SERPL W P-5'-P-CCNC: 15 U/L (ref 1–33)
ANION GAP SERPL CALCULATED.3IONS-SCNC: 14 MMOL/L
AST SERPL-CCNC: 15 U/L (ref 1–32)
B-HCG UR QL: NEGATIVE
BASOPHILS # BLD AUTO: 0.05 10*3/MM3 (ref 0–0.2)
BASOPHILS NFR BLD AUTO: 0.6 % (ref 0–1.5)
BILIRUB SERPL-MCNC: 0.3 MG/DL (ref 0.2–1.2)
BLD GP AB SCN SERPL QL: NEGATIVE
BUN BLD-MCNC: 11 MG/DL (ref 6–20)
BUN/CREAT SERPL: 15.1 (ref 7–25)
CALCIUM SPEC-SCNC: 9.4 MG/DL (ref 8.6–10.5)
CHLORIDE SERPL-SCNC: 104 MMOL/L (ref 98–107)
CO2 SERPL-SCNC: 24 MMOL/L (ref 22–29)
CREAT BLD-MCNC: 0.73 MG/DL (ref 0.57–1)
DEPRECATED RDW RBC AUTO: 38.2 FL (ref 37–54)
EOSINOPHIL # BLD AUTO: 0.3 10*3/MM3 (ref 0–0.4)
EOSINOPHIL NFR BLD AUTO: 3.3 % (ref 0.3–6.2)
ERYTHROCYTE [DISTWIDTH] IN BLOOD BY AUTOMATED COUNT: 12.7 % (ref 12.3–15.4)
GFR SERPL CREATININE-BSD FRML MDRD: 92 ML/MIN/1.73
GLOBULIN UR ELPH-MCNC: 2.9 GM/DL
GLUCOSE BLD-MCNC: 120 MG/DL (ref 65–99)
HCT VFR BLD AUTO: 42.1 % (ref 34–46.6)
HGB BLD-MCNC: 14.1 G/DL (ref 12–15.9)
IMM GRANULOCYTES # BLD AUTO: 0.07 10*3/MM3 (ref 0–0.05)
IMM GRANULOCYTES NFR BLD AUTO: 0.8 % (ref 0–0.5)
LYMPHOCYTES # BLD AUTO: 2.9 10*3/MM3 (ref 0.7–3.1)
LYMPHOCYTES NFR BLD AUTO: 32.3 % (ref 19.6–45.3)
Lab: NORMAL
MCH RBC QN AUTO: 27.7 PG (ref 26.6–33)
MCHC RBC AUTO-ENTMCNC: 33.5 G/DL (ref 31.5–35.7)
MCV RBC AUTO: 82.7 FL (ref 79–97)
MONOCYTES # BLD AUTO: 0.63 10*3/MM3 (ref 0.1–0.9)
MONOCYTES NFR BLD AUTO: 7 % (ref 5–12)
NEUTROPHILS # BLD AUTO: 5.04 10*3/MM3 (ref 1.4–7)
NEUTROPHILS NFR BLD AUTO: 56 % (ref 42.7–76)
NRBC BLD AUTO-RTO: 0 /100 WBC (ref 0–0)
PLATELET # BLD AUTO: 310 10*3/MM3 (ref 140–450)
PMV BLD AUTO: 9.9 FL (ref 6–12)
POTASSIUM BLD-SCNC: 3.9 MMOL/L (ref 3.5–5.2)
PROT SERPL-MCNC: 7 G/DL (ref 6–8.5)
RBC # BLD AUTO: 5.09 10*6/MM3 (ref 3.77–5.28)
RH BLD: POSITIVE
SODIUM BLD-SCNC: 142 MMOL/L (ref 136–145)
T&S EXPIRATION DATE: NORMAL
WBC NRBC COR # BLD: 8.99 10*3/MM3 (ref 3.4–10.8)

## 2019-04-18 PROCEDURE — 81025 URINE PREGNANCY TEST: CPT | Performed by: OBSTETRICS & GYNECOLOGY

## 2019-04-18 PROCEDURE — 86901 BLOOD TYPING SEROLOGIC RH(D): CPT | Performed by: OBSTETRICS & GYNECOLOGY

## 2019-04-18 PROCEDURE — 86900 BLOOD TYPING SEROLOGIC ABO: CPT | Performed by: OBSTETRICS & GYNECOLOGY

## 2019-04-18 PROCEDURE — 36415 COLL VENOUS BLD VENIPUNCTURE: CPT

## 2019-04-18 PROCEDURE — 85025 COMPLETE CBC W/AUTO DIFF WBC: CPT | Performed by: OBSTETRICS & GYNECOLOGY

## 2019-04-18 PROCEDURE — 86850 RBC ANTIBODY SCREEN: CPT | Performed by: OBSTETRICS & GYNECOLOGY

## 2019-04-18 PROCEDURE — 80053 COMPREHEN METABOLIC PANEL: CPT | Performed by: OBSTETRICS & GYNECOLOGY

## 2019-04-18 RX ORDER — SODIUM CHLORIDE, SODIUM GLUCONATE, SODIUM ACETATE, POTASSIUM CHLORIDE, AND MAGNESIUM CHLORIDE 526; 502; 368; 37; 30 MG/100ML; MG/100ML; MG/100ML; MG/100ML; MG/100ML
1000 INJECTION, SOLUTION INTRAVENOUS CONTINUOUS
Status: CANCELLED | OUTPATIENT
Start: 2019-04-19

## 2019-04-18 NOTE — DISCHARGE INSTRUCTIONS
[facility]  Pre-op Information and Guidelines    You will be called after 2 p.m. the day before your surgery (Friday for Monday surgery) and notified of your time for arrival and approximate surgery time.  If you have not received a call by 4P.M., please contact Same Day Surgery at [phone] of if outside [facility] call [phone].    Please Follow these Important Safety Guidelines:    • The morning of your procedure, take only the medications listed below with   A sip of water:_____________________________________________       ______________________________________________    • DO NOT eat or drink anything after 12:00 midnight the night before surgery  Specific instructions concerning drinking clear liquids will be discussed during  the pre-surgery instruction call the day before your surgery.    • If you take a blood thinner (ex. Plavix, Coumadin, aspirin), ask your doctor when to stop it before surgery  STOP DATE: _________________    • Only 2 visitors are allowed in patient rooms at a time  Your visitors will be asked to wait in the lobby until the admission process is complete with the exception of a parent with a child and patients in need of special assistance.    • YOU CANNOT DRIVE YOURSELF HOME  You must be accompanied by someone who will be responsible for driving you home after surgery and for your care at home.    • DO NOT chew gum, use breath mints, hard candy, or smoke the day of surgery  • DO NOT drink alcohol for at least 24 hours before your surgery  • DO NOT wear any jewelry and remove all body piercing before coming to the hospital  • DO NOT wear make-up to the hospital  • If you are having surgery on an extremity (arm/leg/foot) remove nail polish/artificial nails on the surgical side  • Clothing, glasses, contacts, dentures, and hairpieces must be removed before surgery  • Bathe the night before or the morning of your surgery and do not use powders/lotions on  skin.

## 2019-04-19 ENCOUNTER — ANESTHESIA (OUTPATIENT)
Dept: PERIOP | Facility: HOSPITAL | Age: 34
End: 2019-04-19

## 2019-04-19 ENCOUNTER — HOSPITAL ENCOUNTER (OUTPATIENT)
Facility: HOSPITAL | Age: 34
Setting detail: HOSPITAL OUTPATIENT SURGERY
Discharge: HOME | End: 2019-04-19
Attending: OBSTETRICS & GYNECOLOGY | Admitting: ANESTHESIOLOGY

## 2019-04-19 VITALS
SYSTOLIC BLOOD PRESSURE: 141 MMHG | HEART RATE: 77 BPM | RESPIRATION RATE: 18 BRPM | DIASTOLIC BLOOD PRESSURE: 77 MMHG | OXYGEN SATURATION: 98 % | HEIGHT: 65 IN | TEMPERATURE: 98.2 F | WEIGHT: 253.31 LBS | BODY MASS INDEX: 42.2 KG/M2

## 2019-04-19 DIAGNOSIS — R93.89 ABNORMAL PELVIC ULTRASOUND: ICD-10-CM

## 2019-04-19 PROBLEM — Z01.810 PREOP CARDIOVASCULAR EXAM: Status: RESOLVED | Noted: 2019-04-09 | Resolved: 2019-04-19

## 2019-04-19 LAB
ABO GROUP BLD: NORMAL
BLD GP AB SCN SERPL QL: NEGATIVE
Lab: NORMAL
RH BLD: POSITIVE
T&S EXPIRATION DATE: NORMAL

## 2019-04-19 PROCEDURE — 25010000002 DEXAMETHASONE PER 1 MG: Performed by: NURSE ANESTHETIST, CERTIFIED REGISTERED

## 2019-04-19 PROCEDURE — 25010000002 KETOROLAC TROMETHAMINE PER 15 MG: Performed by: NURSE ANESTHETIST, CERTIFIED REGISTERED

## 2019-04-19 PROCEDURE — 88305 TISSUE EXAM BY PATHOLOGIST: CPT | Performed by: PATHOLOGY

## 2019-04-19 PROCEDURE — 25010000002 PROPOFOL 10 MG/ML EMULSION: Performed by: NURSE ANESTHETIST, CERTIFIED REGISTERED

## 2019-04-19 PROCEDURE — 86850 RBC ANTIBODY SCREEN: CPT | Performed by: ANESTHESIOLOGY

## 2019-04-19 PROCEDURE — 25010000002 MIDAZOLAM PER 1 MG: Performed by: NURSE ANESTHETIST, CERTIFIED REGISTERED

## 2019-04-19 PROCEDURE — 25010000002 FENTANYL CITRATE (PF) 100 MCG/2ML SOLUTION: Performed by: NURSE ANESTHETIST, CERTIFIED REGISTERED

## 2019-04-19 PROCEDURE — 25010000002 HYDROMORPHONE 1 MG/ML SOLUTION: Performed by: NURSE ANESTHETIST, CERTIFIED REGISTERED

## 2019-04-19 PROCEDURE — 58558 HYSTEROSCOPY BIOPSY: CPT | Performed by: OBSTETRICS & GYNECOLOGY

## 2019-04-19 PROCEDURE — 88305 TISSUE EXAM BY PATHOLOGIST: CPT | Performed by: OBSTETRICS & GYNECOLOGY

## 2019-04-19 PROCEDURE — 86900 BLOOD TYPING SEROLOGIC ABO: CPT | Performed by: ANESTHESIOLOGY

## 2019-04-19 PROCEDURE — 86901 BLOOD TYPING SEROLOGIC RH(D): CPT | Performed by: ANESTHESIOLOGY

## 2019-04-19 PROCEDURE — 25010000002 ONDANSETRON PER 1 MG: Performed by: NURSE ANESTHETIST, CERTIFIED REGISTERED

## 2019-04-19 RX ORDER — BUPIVACAINE HCL/0.9 % NACL/PF 0.1 %
2 PLASTIC BAG, INJECTION (ML) EPIDURAL
Status: DISCONTINUED | OUTPATIENT
Start: 2019-04-19 | End: 2019-04-19 | Stop reason: HOSPADM

## 2019-04-19 RX ORDER — DIPHENHYDRAMINE HYDROCHLORIDE 50 MG/ML
12.5 INJECTION INTRAMUSCULAR; INTRAVENOUS
Status: DISCONTINUED | OUTPATIENT
Start: 2019-04-19 | End: 2019-04-19 | Stop reason: HOSPADM

## 2019-04-19 RX ORDER — ACETAMINOPHEN 325 MG/1
650 TABLET ORAL ONCE AS NEEDED
Status: DISCONTINUED | OUTPATIENT
Start: 2019-04-19 | End: 2019-04-19 | Stop reason: HOSPADM

## 2019-04-19 RX ORDER — FENTANYL CITRATE 50 UG/ML
INJECTION, SOLUTION INTRAMUSCULAR; INTRAVENOUS AS NEEDED
Status: DISCONTINUED | OUTPATIENT
Start: 2019-04-19 | End: 2019-04-19 | Stop reason: SURG

## 2019-04-19 RX ORDER — SODIUM CHLORIDE, SODIUM GLUCONATE, SODIUM ACETATE, POTASSIUM CHLORIDE, AND MAGNESIUM CHLORIDE 526; 502; 368; 37; 30 MG/100ML; MG/100ML; MG/100ML; MG/100ML; MG/100ML
1000 INJECTION, SOLUTION INTRAVENOUS CONTINUOUS
Status: DISCONTINUED | OUTPATIENT
Start: 2019-04-19 | End: 2019-04-19 | Stop reason: HOSPADM

## 2019-04-19 RX ORDER — FLUMAZENIL 0.1 MG/ML
0.2 INJECTION INTRAVENOUS AS NEEDED
Status: DISCONTINUED | OUTPATIENT
Start: 2019-04-19 | End: 2019-04-19 | Stop reason: HOSPADM

## 2019-04-19 RX ORDER — DEXAMETHASONE SODIUM PHOSPHATE 4 MG/ML
8 INJECTION, SOLUTION INTRA-ARTICULAR; INTRALESIONAL; INTRAMUSCULAR; INTRAVENOUS; SOFT TISSUE ONCE AS NEEDED
Status: DISCONTINUED | OUTPATIENT
Start: 2019-04-19 | End: 2019-04-19

## 2019-04-19 RX ORDER — ONDANSETRON 2 MG/ML
4 INJECTION INTRAMUSCULAR; INTRAVENOUS ONCE AS NEEDED
Status: DISCONTINUED | OUTPATIENT
Start: 2019-04-19 | End: 2019-04-19 | Stop reason: HOSPADM

## 2019-04-19 RX ORDER — ONDANSETRON 2 MG/ML
INJECTION INTRAMUSCULAR; INTRAVENOUS AS NEEDED
Status: DISCONTINUED | OUTPATIENT
Start: 2019-04-19 | End: 2019-04-19 | Stop reason: SURG

## 2019-04-19 RX ORDER — PROMETHAZINE HYDROCHLORIDE 25 MG/1
25 SUPPOSITORY RECTAL ONCE AS NEEDED
Status: DISCONTINUED | OUTPATIENT
Start: 2019-04-19 | End: 2019-04-19 | Stop reason: HOSPADM

## 2019-04-19 RX ORDER — SODIUM CHLORIDE 0.9 % (FLUSH) 0.9 %
3 SYRINGE (ML) INJECTION EVERY 12 HOURS SCHEDULED
Status: DISCONTINUED | OUTPATIENT
Start: 2019-04-19 | End: 2019-04-19 | Stop reason: HOSPADM

## 2019-04-19 RX ORDER — LABETALOL HYDROCHLORIDE 5 MG/ML
5 INJECTION, SOLUTION INTRAVENOUS
Status: DISCONTINUED | OUTPATIENT
Start: 2019-04-19 | End: 2019-04-19 | Stop reason: HOSPADM

## 2019-04-19 RX ORDER — PROMETHAZINE HYDROCHLORIDE 25 MG/1
25 TABLET ORAL ONCE AS NEEDED
Status: DISCONTINUED | OUTPATIENT
Start: 2019-04-19 | End: 2019-04-19 | Stop reason: HOSPADM

## 2019-04-19 RX ORDER — ACETAMINOPHEN 650 MG/1
650 SUPPOSITORY RECTAL ONCE AS NEEDED
Status: DISCONTINUED | OUTPATIENT
Start: 2019-04-19 | End: 2019-04-19 | Stop reason: HOSPADM

## 2019-04-19 RX ORDER — DEXAMETHASONE SODIUM PHOSPHATE 4 MG/ML
INJECTION, SOLUTION INTRA-ARTICULAR; INTRALESIONAL; INTRAMUSCULAR; INTRAVENOUS; SOFT TISSUE AS NEEDED
Status: DISCONTINUED | OUTPATIENT
Start: 2019-04-19 | End: 2019-04-19 | Stop reason: SURG

## 2019-04-19 RX ORDER — KETOROLAC TROMETHAMINE 30 MG/ML
INJECTION, SOLUTION INTRAMUSCULAR; INTRAVENOUS AS NEEDED
Status: DISCONTINUED | OUTPATIENT
Start: 2019-04-19 | End: 2019-04-19 | Stop reason: SURG

## 2019-04-19 RX ORDER — ROCURONIUM BROMIDE 10 MG/ML
INJECTION, SOLUTION INTRAVENOUS AS NEEDED
Status: DISCONTINUED | OUTPATIENT
Start: 2019-04-19 | End: 2019-04-19 | Stop reason: SURG

## 2019-04-19 RX ORDER — NALOXONE HCL 0.4 MG/ML
0.4 VIAL (ML) INJECTION AS NEEDED
Status: DISCONTINUED | OUTPATIENT
Start: 2019-04-19 | End: 2019-04-19 | Stop reason: HOSPADM

## 2019-04-19 RX ORDER — PROMETHAZINE HYDROCHLORIDE 25 MG/ML
12.5 INJECTION, SOLUTION INTRAMUSCULAR; INTRAVENOUS ONCE AS NEEDED
Status: DISCONTINUED | OUTPATIENT
Start: 2019-04-19 | End: 2019-04-19 | Stop reason: HOSPADM

## 2019-04-19 RX ORDER — MIDAZOLAM HYDROCHLORIDE 1 MG/ML
INJECTION INTRAMUSCULAR; INTRAVENOUS AS NEEDED
Status: DISCONTINUED | OUTPATIENT
Start: 2019-04-19 | End: 2019-04-19 | Stop reason: SURG

## 2019-04-19 RX ORDER — PROPOFOL 10 MG/ML
VIAL (ML) INTRAVENOUS AS NEEDED
Status: DISCONTINUED | OUTPATIENT
Start: 2019-04-19 | End: 2019-04-19 | Stop reason: SURG

## 2019-04-19 RX ORDER — SODIUM CHLORIDE 0.9 % (FLUSH) 0.9 %
3-10 SYRINGE (ML) INJECTION AS NEEDED
Status: DISCONTINUED | OUTPATIENT
Start: 2019-04-19 | End: 2019-04-19 | Stop reason: HOSPADM

## 2019-04-19 RX ORDER — LIDOCAINE HYDROCHLORIDE 20 MG/ML
INJECTION, SOLUTION INFILTRATION; PERINEURAL AS NEEDED
Status: DISCONTINUED | OUTPATIENT
Start: 2019-04-19 | End: 2019-04-19 | Stop reason: SURG

## 2019-04-19 RX ORDER — EPHEDRINE SULFATE 50 MG/ML
5 INJECTION, SOLUTION INTRAVENOUS ONCE AS NEEDED
Status: DISCONTINUED | OUTPATIENT
Start: 2019-04-19 | End: 2019-04-19 | Stop reason: HOSPADM

## 2019-04-19 RX ADMIN — FENTANYL CITRATE 50 MCG: 50 INJECTION, SOLUTION INTRAMUSCULAR; INTRAVENOUS at 12:04

## 2019-04-19 RX ADMIN — PROPOFOL 150 MG: 10 INJECTION, EMULSION INTRAVENOUS at 12:04

## 2019-04-19 RX ADMIN — Medication 2 G: at 12:10

## 2019-04-19 RX ADMIN — HYDROMORPHONE HYDROCHLORIDE 0.5 MG: 1 INJECTION, SOLUTION INTRAMUSCULAR; INTRAVENOUS; SUBCUTANEOUS at 13:28

## 2019-04-19 RX ADMIN — HYDROMORPHONE HYDROCHLORIDE 0.5 MG: 1 INJECTION, SOLUTION INTRAMUSCULAR; INTRAVENOUS; SUBCUTANEOUS at 13:18

## 2019-04-19 RX ADMIN — LIDOCAINE HYDROCHLORIDE 100 MG: 20 INJECTION, SOLUTION INFILTRATION; PERINEURAL at 12:04

## 2019-04-19 RX ADMIN — MIDAZOLAM HYDROCHLORIDE 2 MG: 2 INJECTION, SOLUTION INTRAMUSCULAR; INTRAVENOUS at 11:58

## 2019-04-19 RX ADMIN — KETOROLAC TROMETHAMINE 30 MG: 30 INJECTION, SOLUTION INTRAMUSCULAR at 12:39

## 2019-04-19 RX ADMIN — ONDANSETRON 4 MG: 2 INJECTION INTRAMUSCULAR; INTRAVENOUS at 12:39

## 2019-04-19 RX ADMIN — FENTANYL CITRATE 50 MCG: 50 INJECTION, SOLUTION INTRAMUSCULAR; INTRAVENOUS at 12:20

## 2019-04-19 RX ADMIN — ROCURONIUM BROMIDE 5 MG: 10 INJECTION INTRAVENOUS at 12:04

## 2019-04-19 RX ADMIN — DEXAMETHASONE SODIUM PHOSPHATE 4 MG: 4 INJECTION, SOLUTION INTRAMUSCULAR; INTRAVENOUS at 12:18

## 2019-04-19 RX ADMIN — SODIUM CHLORIDE, SODIUM GLUCONATE, SODIUM ACETATE, POTASSIUM CHLORIDE, AND MAGNESIUM CHLORIDE 1000 ML: 526; 502; 368; 37; 30 INJECTION, SOLUTION INTRAVENOUS at 10:19

## 2019-04-19 RX ADMIN — HYDROMORPHONE HYDROCHLORIDE 0.5 MG: 1 INJECTION, SOLUTION INTRAMUSCULAR; INTRAVENOUS; SUBCUTANEOUS at 13:08

## 2019-04-19 NOTE — ANESTHESIA PROCEDURE NOTES
Airway  Urgency: elective    Airway not difficult    General Information and Staff    Patient location during procedure: OR    Indications and Patient Condition  Indications for airway management: airway protection    Preoxygenated: yes  Mask difficulty assessment: 1 - vent by mask    Final Airway Details  Final airway type: endotracheal airway      Successful airway: ETT  Cuffed: yes   Successful intubation technique: direct laryngoscopy  Facilitating devices/methods: intubating stylet  Endotracheal tube insertion site: oral  Blade: Eva  Blade size: 3  ETT size (mm): 7.5  Cormack-Lehane Classification: grade I - full view of glottis  Placement verified by: chest auscultation, capnometry and palpation of cuff   Cuff volume (mL): 8  Measured from: gums  ETT to gums (cm): 20  Number of attempts at approach: 1    Additional Comments  Intubated by RAFAEL Ibanez under the supervision of SHAHID Crowe CRNA. Teeth, lips and gums in pre-anesthetic condition after ETT placement.

## 2019-04-19 NOTE — ANESTHESIA POSTPROCEDURE EVALUATION
Patient: Narda Sheehan    Procedure Summary     Date:  04/19/19 Room / Location:  [facility] OR 05 / [facility] OR    Anesthesia Start:  1159 Anesthesia Stop:  1254    Procedures:       HYSTEROSCOPY, (N/A Vagina)      SUCTION DILATION AND CURETTAGE (N/A Vagina) Diagnosis:       Abnormal pelvic ultrasound      (Abnormal pelvic ultrasound [R93.89])    Surgeon:  Evita Dominique MD Provider:  Denia Crowe CRNA    Anesthesia Type:  general ASA Status:  3          Anesthesia Type: general  Last vitals  BP   122/80 (04/19/19 1016)   Temp   98.5 °F (36.9 °C) (04/19/19 1016)   Pulse   80 (04/19/19 1016)   Resp   18 (04/19/19 1016)     SpO2   96 % (04/19/19 1016)     Post Anesthesia Care and Evaluation    Patient location during evaluation: bedside  Patient participation: waiting for patient participation  Level of consciousness: sleepy but conscious  Pain score: 0  Pain management: adequate  Airway patency: patent  Anesthetic complications: No anesthetic complications  PONV Status: none  Cardiovascular status: acceptable  Respiratory status: acceptable  Hydration status: acceptable

## 2019-04-19 NOTE — ANESTHESIA PREPROCEDURE EVALUATION
Anesthesia Evaluation     no history of anesthetic complications:  NPO Solid Status: > 8 hours  NPO Liquid Status: > 8 hours           Airway   Mallampati: I  TM distance: >3 FB  Neck ROM: full  No difficulty expected  Dental - normal exam     Pulmonary - negative pulmonary ROS and normal exam    breath sounds clear to auscultation  (-) COPD, asthma, sleep apnea, not a smoker  Cardiovascular - normal exam  Exercise tolerance: good (4-7 METS)    ECG reviewed  Rhythm: regular  Rate: normal    (+) valvular problems/murmurs MVP,   (-) hypertension, past MI, dysrhythmias, angina, cardiac stents, DVT, hyperlipidemia    ROS comment: Normal sinus rhythm  Normal ECG    Confirmed by AKRAM    Neuro/Psych- negative ROS  (-) seizures, TIA, CVA, headaches, psychiatric history  GI/Hepatic/Renal/Endo    (+) morbid obesity,    (-) GERD, hepatitis, liver disease, no renal disease, diabetes, hypothyroidism    Musculoskeletal (-) negative ROS    (-) arthralgias  Abdominal   (+) obese,    Substance History - negative use  (-) alcohol use, drug use     OB/GYN    (-)  Pregnant        Other        (-) arthritis, history of cancer                  Anesthesia Plan    ASA 3     general     intravenous induction   Anesthetic plan, all risks, benefits, and alternatives have been provided, discussed and informed consent has been obtained with: patient and spouse/significant other.

## 2019-04-19 NOTE — OP NOTE
Shared            Operative Note     PREOP DIAGNOSES:  Menometrorrhagia, [N92.1]  Abnormal pelvic ultrasound     POSTOP DIAGNOSES:  Menometrorrhagia  Abnormal pelvic ultrasound  Endometrial polyps     PROCEDURE:   1.  HYSTEROSCOPY  2.  DILATION AND SUCTION CURETTAGE  3. Cervical curettage     SURGEON: Evita Dominique MD     ASSISTANT: Nahomy Romero CSA     ANESTHESIA: General     ESTIMATED BLOOD LOSS: < 5  ml   Urine output: 200 cc clear urine  IVF: 700 cc crystalloid     FINDINGS: On hysteroscopic surveillance there were small pedunculated polyps in the uterine fundus bilaterally. The endometrial lining appeared only mildly thickened.  Bilateral ostia were viewed.  Question of a small polyp at the upper segment of cervical canal almost adjacent to uterine cavity.     COMPLICATIONS: none     DESCRIPTION OF OPERATION:   Patient was identified and brought to the operating room.  She underwent general endotracheal anesthesia per anesthesia protocol without difficulty.   She was placed in the high lithotomy position and prepped and draped in the usual sterile fashion.  Time out was performed.  Her bladder was drained of clear urine.    A bivalved speculum was placed in the vagina and the anterior lip of the cervix was grasped with a single toothed tenaculum. The cervix was sequentially dilated to 8 Yoruba with Carri dilators. A hysteroscope was advanced into the uterine cavity with findings noted above. The hysteroscope was then removed. A curettage of the endocervical canal was performed and specimen sent separately.  Suction curettage was then  performed with curettings sent to pathology for examination. Sharp curettage was performed with good uterine sejal noted in 360 degrees.  Repeat suction curettage was performed to clear the uterus of all the intrauterine contents.      All instruments were removed. The patient was taken down from the low lithotomy position.  The patient  was awakened from general anesthesia and transferred to the recovery room in stable condition having tolerated the procedure well.  Sponge lap, instrument, and needle counts were correct x 2.  There were no complications.  Patient received ancef at start of procedure and dose of toradol at the end of the procedure.                            Admission (Discharged) on 4/12/2019            Revision History            Detailed Report        Op Note:

## 2019-04-22 LAB
LAB AP CASE REPORT: NORMAL
PATH REPORT.FINAL DX SPEC: NORMAL
PATH REPORT.GROSS SPEC: NORMAL

## 2019-05-07 ENCOUNTER — HOSPITAL ENCOUNTER (EMERGENCY)
Facility: HOSPITAL | Age: 34
Discharge: HOME | End: 2019-05-07
Attending: EMERGENCY MEDICINE | Admitting: EMERGENCY MEDICINE

## 2019-05-07 ENCOUNTER — APPOINTMENT (OUTPATIENT)
Dept: GENERAL RADIOLOGY | Facility: HOSPITAL | Age: 34
End: 2019-05-07

## 2019-05-07 ENCOUNTER — APPOINTMENT (OUTPATIENT)
Dept: ULTRASOUND IMAGING | Facility: HOSPITAL | Age: 34
End: 2019-05-07

## 2019-05-07 VITALS
TEMPERATURE: 98 F | OXYGEN SATURATION: 97 % | WEIGHT: 257.3 LBS | BODY MASS INDEX: 42.87 KG/M2 | HEIGHT: 65 IN | SYSTOLIC BLOOD PRESSURE: 127 MMHG | DIASTOLIC BLOOD PRESSURE: 80 MMHG | HEART RATE: 80 BPM | RESPIRATION RATE: 18 BRPM

## 2019-05-07 DIAGNOSIS — R10.30 LOWER ABDOMINAL PAIN: Primary | ICD-10-CM

## 2019-05-07 LAB
ALBUMIN SERPL-MCNC: 3.9 G/DL (ref 3.5–5.2)
ALBUMIN/GLOB SERPL: 1.1 G/DL
ALP SERPL-CCNC: 92 U/L (ref 39–117)
ALT SERPL W P-5'-P-CCNC: 13 U/L (ref 1–33)
ANION GAP SERPL CALCULATED.3IONS-SCNC: 12 MMOL/L
AST SERPL-CCNC: 15 U/L (ref 1–32)
BASOPHILS # BLD AUTO: 0.05 10*3/MM3 (ref 0–0.2)
BASOPHILS NFR BLD AUTO: 0.5 % (ref 0–1.5)
BILIRUB SERPL-MCNC: 0.6 MG/DL (ref 0.2–1.2)
BILIRUB UR QL STRIP: NEGATIVE
BUN BLD-MCNC: 10 MG/DL (ref 6–20)
BUN/CREAT SERPL: 12.8 (ref 7–25)
CALCIUM SPEC-SCNC: 9.4 MG/DL (ref 8.6–10.5)
CANDIDA ALBICANS: NEGATIVE
CHLORIDE SERPL-SCNC: 104 MMOL/L (ref 98–107)
CLARITY UR: CLEAR
CO2 SERPL-SCNC: 26 MMOL/L (ref 22–29)
COLOR UR: YELLOW
CREAT BLD-MCNC: 0.78 MG/DL (ref 0.57–1)
DEPRECATED RDW RBC AUTO: 38 FL (ref 37–54)
EOSINOPHIL # BLD AUTO: 0.12 10*3/MM3 (ref 0–0.4)
EOSINOPHIL NFR BLD AUTO: 1.2 % (ref 0.3–6.2)
ERYTHROCYTE [DISTWIDTH] IN BLOOD BY AUTOMATED COUNT: 12.6 % (ref 12.3–15.4)
GARDNERELLA VAGINALIS: NEGATIVE
GFR SERPL CREATININE-BSD FRML MDRD: 86 ML/MIN/1.73
GLOBULIN UR ELPH-MCNC: 3.6 GM/DL
GLUCOSE BLD-MCNC: 91 MG/DL (ref 65–99)
GLUCOSE UR STRIP-MCNC: NEGATIVE MG/DL
HCG SERPL QL: NEGATIVE
HCT VFR BLD AUTO: 42.2 % (ref 34–46.6)
HGB BLD-MCNC: 14.2 G/DL (ref 12–15.9)
HGB UR QL STRIP.AUTO: NEGATIVE
IMM GRANULOCYTES # BLD AUTO: 0.05 10*3/MM3 (ref 0–0.05)
IMM GRANULOCYTES NFR BLD AUTO: 0.5 % (ref 0–0.5)
KETONES UR QL STRIP: NEGATIVE
LEUKOCYTE ESTERASE UR QL STRIP.AUTO: NEGATIVE
LIPASE SERPL-CCNC: 25 U/L (ref 13–60)
LYMPHOCYTES # BLD AUTO: 1.71 10*3/MM3 (ref 0.7–3.1)
LYMPHOCYTES NFR BLD AUTO: 17 % (ref 19.6–45.3)
MCH RBC QN AUTO: 27.7 PG (ref 26.6–33)
MCHC RBC AUTO-ENTMCNC: 33.6 G/DL (ref 31.5–35.7)
MCV RBC AUTO: 82.4 FL (ref 79–97)
MONOCYTES # BLD AUTO: 0.6 10*3/MM3 (ref 0.1–0.9)
MONOCYTES NFR BLD AUTO: 6 % (ref 5–12)
NEUTROPHILS # BLD AUTO: 7.52 10*3/MM3 (ref 1.7–7)
NEUTROPHILS NFR BLD AUTO: 74.8 % (ref 42.7–76)
NITRITE UR QL STRIP: NEGATIVE
NRBC BLD AUTO-RTO: 0 /100 WBC (ref 0–0.2)
PH UR STRIP.AUTO: 6.5 [PH] (ref 5–9)
PLATELET # BLD AUTO: 278 10*3/MM3 (ref 140–450)
PMV BLD AUTO: 9.7 FL (ref 6–12)
POTASSIUM BLD-SCNC: 3.5 MMOL/L (ref 3.5–5.2)
PROT SERPL-MCNC: 7.5 G/DL (ref 6–8.5)
PROT UR QL STRIP: NEGATIVE
RBC # BLD AUTO: 5.12 10*6/MM3 (ref 3.77–5.28)
SODIUM BLD-SCNC: 142 MMOL/L (ref 136–145)
SP GR UR STRIP: 1.01 (ref 1–1.03)
TRICHOMONAS VAGINALIS PCR: NEGATIVE
UROBILINOGEN UR QL STRIP: NORMAL
WBC NRBC COR # BLD: 10.05 10*3/MM3 (ref 3.4–10.8)

## 2019-05-07 PROCEDURE — 76830 TRANSVAGINAL US NON-OB: CPT

## 2019-05-07 PROCEDURE — 96361 HYDRATE IV INFUSION ADD-ON: CPT

## 2019-05-07 PROCEDURE — 87480 CANDIDA DNA DIR PROBE: CPT | Performed by: EMERGENCY MEDICINE

## 2019-05-07 PROCEDURE — 87660 TRICHOMONAS VAGIN DIR PROBE: CPT | Performed by: EMERGENCY MEDICINE

## 2019-05-07 PROCEDURE — 87510 GARDNER VAG DNA DIR PROBE: CPT | Performed by: EMERGENCY MEDICINE

## 2019-05-07 PROCEDURE — 81003 URINALYSIS AUTO W/O SCOPE: CPT | Performed by: EMERGENCY MEDICINE

## 2019-05-07 PROCEDURE — 74018 RADEX ABDOMEN 1 VIEW: CPT

## 2019-05-07 PROCEDURE — 25010000002 MORPHINE PER 10 MG: Performed by: STUDENT IN AN ORGANIZED HEALTH CARE EDUCATION/TRAINING PROGRAM

## 2019-05-07 PROCEDURE — 83690 ASSAY OF LIPASE: CPT | Performed by: STUDENT IN AN ORGANIZED HEALTH CARE EDUCATION/TRAINING PROGRAM

## 2019-05-07 PROCEDURE — 87661 TRICHOMONAS VAGINALIS AMPLIF: CPT | Performed by: STUDENT IN AN ORGANIZED HEALTH CARE EDUCATION/TRAINING PROGRAM

## 2019-05-07 PROCEDURE — 84703 CHORIONIC GONADOTROPIN ASSAY: CPT | Performed by: STUDENT IN AN ORGANIZED HEALTH CARE EDUCATION/TRAINING PROGRAM

## 2019-05-07 PROCEDURE — 96374 THER/PROPH/DIAG INJ IV PUSH: CPT

## 2019-05-07 PROCEDURE — 87491 CHLMYD TRACH DNA AMP PROBE: CPT | Performed by: STUDENT IN AN ORGANIZED HEALTH CARE EDUCATION/TRAINING PROGRAM

## 2019-05-07 PROCEDURE — 87591 N.GONORRHOEAE DNA AMP PROB: CPT | Performed by: STUDENT IN AN ORGANIZED HEALTH CARE EDUCATION/TRAINING PROGRAM

## 2019-05-07 PROCEDURE — 85025 COMPLETE CBC W/AUTO DIFF WBC: CPT | Performed by: STUDENT IN AN ORGANIZED HEALTH CARE EDUCATION/TRAINING PROGRAM

## 2019-05-07 PROCEDURE — 93976 VASCULAR STUDY: CPT

## 2019-05-07 PROCEDURE — 80053 COMPREHEN METABOLIC PANEL: CPT | Performed by: STUDENT IN AN ORGANIZED HEALTH CARE EDUCATION/TRAINING PROGRAM

## 2019-05-07 PROCEDURE — 99284 EMERGENCY DEPT VISIT MOD MDM: CPT

## 2019-05-07 RX ORDER — SODIUM CHLORIDE 0.9 % (FLUSH) 0.9 %
10 SYRINGE (ML) INJECTION AS NEEDED
Status: DISCONTINUED | OUTPATIENT
Start: 2019-05-07 | End: 2019-05-07 | Stop reason: HOSPADM

## 2019-05-07 RX ORDER — SODIUM CHLORIDE 9 MG/ML
125 INJECTION, SOLUTION INTRAVENOUS CONTINUOUS
Status: DISCONTINUED | OUTPATIENT
Start: 2019-05-07 | End: 2019-05-07 | Stop reason: HOSPADM

## 2019-05-07 RX ORDER — MORPHINE SULFATE 2 MG/ML
2 INJECTION, SOLUTION INTRAMUSCULAR; INTRAVENOUS ONCE
Status: COMPLETED | OUTPATIENT
Start: 2019-05-07 | End: 2019-05-07

## 2019-05-07 RX ADMIN — SODIUM CHLORIDE 125 ML/HR: 9 INJECTION, SOLUTION INTRAVENOUS at 12:16

## 2019-05-07 RX ADMIN — MORPHINE SULFATE 2 MG: 2 INJECTION, SOLUTION INTRAMUSCULAR; INTRAVENOUS at 12:16

## 2019-05-07 NOTE — ED NOTES
20g iv removed from rac, catheter intact, pressure dressing to site, no bleeding noted     Nilsa Knowles, RN  05/07/19 1925

## 2019-05-07 NOTE — ED PROVIDER NOTES
Subjective   History of Present Illness  Patient is a 33-year-old  female presenting to [facility] ED complaining of left lower quadrant pain x3 days.  Patient reported increase in pain severity to a 10 out of 10 with radiation to the right lower quadrant.  Patient denies fever however notes nausea.  Notes normal bowel movement.  Patient has been socially active in the past 2 weeks.    Surgical history is positive for 2 C-sections, tubal ligation and recent D&C for individual thickening 2 weeks ago.    Patient denies chest pain, chest pressure, palpitations, diarrhea.    Review of Systems   Constitutional: Negative for activity change, appetite change, chills and fever.   HENT: Negative for congestion, sinus pressure, sinus pain, sneezing and sore throat.    Eyes: Negative for pain, discharge and itching.   Respiratory: Negative for apnea, choking and chest tightness.    Cardiovascular: Negative for chest pain and leg swelling.   Gastrointestinal: Positive for abdominal pain and nausea. Negative for abdominal distention, anal bleeding, blood in stool, constipation, diarrhea, rectal pain and vomiting.   Endocrine: Negative for cold intolerance, heat intolerance and polydipsia.   Genitourinary: Negative for difficulty urinating, dyspareunia, dysuria and enuresis.   Musculoskeletal: Negative for arthralgias, back pain and gait problem.   Allergic/Immunologic: Negative for environmental allergies and food allergies.   Neurological: Negative for dizziness, facial asymmetry, light-headedness, numbness and headaches.       Past Medical History:   Diagnosis Date   • Acute pain    • Acute pharyngitis    • Aphthous ulcer of mouth    • Glossitis    • Hyperlipidemia    • Hypertension     no longer on medication   • Streptococcal sore throat    • Upper respiratory infection        No Known Allergies    Past Surgical History:   Procedure Laterality Date   •  SECTION     • D&C WITH SUCTION N/A 2019    Procedure:  SUCTION DILATION AND CURETTAGE;  Surgeon: Evita Dominique MD;  Location: [facility];  Service: Obstetrics/Gynecology   • ENDOMETRIAL ABLATION  2013   • HYSTEROSCOPY N/A 4/19/2019    Procedure: HYSTEROSCOPY,;  Surgeon: Evita Dominique MD;  Location: [facility];  Service: Obstetrics/Gynecology   • TUBAL ABDOMINAL LIGATION         Family History   Problem Relation Age of Onset   • Cancer Mother    • Leukemia Mother    • Hypertension Father    • Multiple sclerosis Father    • No Known Problems Brother    • Asthma Daughter    • Arnold-Chiari malformation Son    • No Known Problems Maternal Grandmother    • No Known Problems Maternal Grandfather        Social History     Socioeconomic History   • Marital status:      Spouse name: Not on file   • Number of children: Not on file   • Years of education: Not on file   • Highest education level: Not on file   Tobacco Use   • Smoking status: Never Smoker   • Smokeless tobacco: Never Used   Substance and Sexual Activity   • Alcohol use: No   • Drug use: No   • Sexual activity: Defer     Partners: Male     Birth control/protection: Surgical           Objective    Vitals:    05/07/19 1222 05/07/19 1232 05/07/19 1302 05/07/19 1344   BP: 130/79 125/74 125/80    BP Location: Left arm Left arm Left arm    Patient Position: Lying Lying Sitting    Pulse: 77 75 76 82   Resp: 20 20 20    Temp:       TempSrc:       SpO2: (!) 74% 98% 96% 98%   Weight:       Height:         Physical Exam   Constitutional: She is oriented to person, place, and time. She appears well-developed and well-nourished.  Non-toxic appearance. She does not appear ill.   HENT:   Head: Normocephalic and atraumatic.   Mouth/Throat: Oropharynx is clear and moist.   Eyes: EOM are normal. Pupils are equal, round, and reactive to light.   Cardiovascular: Normal rate, regular rhythm and normal heart sounds.   Pulmonary/Chest: Effort normal and breath sounds normal. No respiratory distress. She has no wheezes.    Abdominal: Soft. Normal appearance and bowel sounds are normal. There is tenderness in the right lower quadrant and left lower quadrant. There is positive Panda's sign. There is no rigidity, no rebound and no guarding.   Genitourinary: Vagina normal.   Neurological: She is alert and oriented to person, place, and time.   Skin: Skin is warm.       Procedures       US Non-ob Transvaginal   Final Result   CONCLUSION:   1.55 cm fibroid right side of the body of uterus.   2.34 cm x 1.30 cm x 1.07 cm hyperechoic area left side of the   uterine fundus, perhaps related to scarring from prior .   Normal left ovary.   Right ovary not visualized.      [id number]      Electronically signed by:  Jarred Ruvalcaba MD  2019 2:16 PM CDT   Workstation: [id number]      XR Abdomen KUB   Final Result   Non-obstructed bowel gas pattern. No acute abnormality   identified. Small calcific density in the left mid abdomen is   thought unlikely to represent a urinary calculus for the reasons   discussed above.      If pain or symptoms persist beyond reasonable expectations, a CT   examination is suggested, as is deemed clinically appropriate.      Electronically signed by:  Lottie Agudelo MD  2019 1:26 PM CDT   Workstation: [id number]      US Testicular or Ovarian Vascular Limited    (Results Pending)     Results for orders placed or performed during the hospital encounter of 19   Comprehensive Metabolic Panel   Result Value Ref Range    Glucose 91 65 - 99 mg/dL    BUN 10 6 - 20 mg/dL    Creatinine 0.78 0.57 - 1.00 mg/dL    Sodium 142 136 - 145 mmol/L    Potassium 3.5 3.5 - 5.2 mmol/L    Chloride 104 98 - 107 mmol/L    CO2 26.0 22.0 - 29.0 mmol/L    Calcium 9.4 8.6 - 10.5 mg/dL    Total Protein 7.5 6.0 - 8.5 g/dL    Albumin 3.90 3.50 - 5.20 g/dL    ALT (SGPT) 13 1 - 33 U/L    AST (SGOT) 15 1 - 32 U/L    Alkaline Phosphatase 92 39 - 117 U/L    Total Bilirubin 0.6 0.2 - 1.2 mg/dL    eGFR Non African Amer 86 >60 mL/min/1.73     Globulin 3.6 gm/dL    A/G Ratio 1.1 g/dL    BUN/Creatinine Ratio 12.8 7.0 - 25.0    Anion Gap 12.0 mmol/L   hCG, Serum, Qualitative   Result Value Ref Range    HCG Qualitative Negative Negative   Lipase   Result Value Ref Range    Lipase 25 13 - 60 U/L   CBC Auto Differential   Result Value Ref Range    WBC 10.05 3.40 - 10.80 10*3/mm3    RBC 5.12 3.77 - 5.28 10*6/mm3    Hemoglobin 14.2 12.0 - 15.9 g/dL    Hematocrit 42.2 34.0 - 46.6 %    MCV 82.4 79.0 - 97.0 fL    MCH 27.7 26.6 - 33.0 pg    MCHC 33.6 31.5 - 35.7 g/dL    RDW 12.6 12.3 - 15.4 %    RDW-SD 38.0 37.0 - 54.0 fl    MPV 9.7 6.0 - 12.0 fL    Platelets 278 140 - 450 10*3/mm3    Neutrophil % 74.8 42.7 - 76.0 %    Lymphocyte % 17.0 (L) 19.6 - 45.3 %    Monocyte % 6.0 5.0 - 12.0 %    Eosinophil % 1.2 0.3 - 6.2 %    Basophil % 0.5 0.0 - 1.5 %    Immature Grans % 0.5 0.0 - 0.5 %    Neutrophils, Absolute 7.52 (H) 1.70 - 7.00 10*3/mm3    Lymphocytes, Absolute 1.71 0.70 - 3.10 10*3/mm3    Monocytes, Absolute 0.60 0.10 - 0.90 10*3/mm3    Eosinophils, Absolute 0.12 0.00 - 0.40 10*3/mm3    Basophils, Absolute 0.05 0.00 - 0.20 10*3/mm3    Immature Grans, Absolute 0.05 0.00 - 0.05 10*3/mm3    nRBC 0.0 0.0 - 0.2 /100 WBC   Urinalysis With Microscopic If Indicated (No Culture) - Urine, Clean Catch   Result Value Ref Range    Color, UA Yellow Yellow, Straw, Dark Yellow, Madalyn    Appearance, UA Clear Clear    pH, UA 6.5 5.0 - 9.0    Specific Gravity, UA 1.006 1.003 - 1.030    Glucose, UA Negative Negative    Ketones, UA Negative Negative    Bilirubin, UA Negative Negative    Blood, UA Negative Negative    Protein, UA Negative Negative    Leuk Esterase, UA Negative Negative    Nitrite, UA Negative Negative    Urobilinogen, UA 0.2 E.U./dL 0.2 - 1.0 E.U./dL       ED Course        Patient is a 33-year-old  female presenting to [facility] complaining of left lower quadrant abdominal pain.  Lipase was 25,, normal CMP, normal urinalysis, negative hCG.   Pelvic exam endorsed minimal left adnexal tenderness, no erythema or discharge from the cervix. . Transvaginal ultrasound revealed 1.55 cm fibroid right side of the body of the uterus.  2.34 cm x 1.30 cm x 1.0 cm hyperechoic area left side of the uterine fundus, perhaps related to scarring from prior .  Normal left ovary.  Right ovary was not visualized.  X-ray of the abdomen revealed nonobstructive bowel gas pattern.  No acute abnormality identified.  Small calcific density in the left mid abdomen was found.  Repeat physical examination the patient endorsed slight left lower quadrant tenderness.  Patient reported improvement of symptoms.  No signs of peritonitis.  Patient was discharged with caution of return to ED if fever, nausea or worsening pain.  Patient was advised to follow-up with OB/gyne and to go over results of final testing.  Patient remained stable throughout ED course.  Patient was stable upon discharge.    Signed,   Dago Estrada MD  Family Medicine Resident PGY1  [facility]        This document has been electronically signed by Dago Estrada MD on May 7, 2019 2:44 PM            MDM  Number of Diagnoses or Management Options     Amount and/or Complexity of Data Reviewed  Clinical lab tests: reviewed  Tests in the radiology section of CPT®: reviewed  Discuss the patient with other providers: yes    Patient Progress  Patient progress: stable        Final diagnoses:   Lower abdominal pain            Dago Estrada MD  Resident  [phone]

## 2019-05-08 ENCOUNTER — OFFICE VISIT (OUTPATIENT)
Dept: OBSTETRICS AND GYNECOLOGY | Facility: CLINIC | Age: 34
End: 2019-05-08

## 2019-05-08 VITALS
BODY MASS INDEX: 42.49 KG/M2 | DIASTOLIC BLOOD PRESSURE: 73 MMHG | SYSTOLIC BLOOD PRESSURE: 111 MMHG | HEIGHT: 65 IN | WEIGHT: 255 LBS

## 2019-05-08 DIAGNOSIS — R10.32 LEFT LOWER QUADRANT PAIN: Primary | ICD-10-CM

## 2019-05-08 DIAGNOSIS — Z09 POSTOP CHECK: ICD-10-CM

## 2019-05-08 PROCEDURE — 99213 OFFICE O/P EST LOW 20 MIN: CPT | Performed by: OBSTETRICS & GYNECOLOGY

## 2019-05-09 ENCOUNTER — OFFICE VISIT (OUTPATIENT)
Dept: FAMILY MEDICINE CLINIC | Facility: CLINIC | Age: 34
End: 2019-05-09

## 2019-05-09 VITALS
BODY MASS INDEX: 42.49 KG/M2 | DIASTOLIC BLOOD PRESSURE: 100 MMHG | WEIGHT: 255 LBS | RESPIRATION RATE: 20 BRPM | HEIGHT: 65 IN | HEART RATE: 78 BPM | SYSTOLIC BLOOD PRESSURE: 140 MMHG | OXYGEN SATURATION: 99 %

## 2019-05-09 DIAGNOSIS — Z09 HOSPITAL DISCHARGE FOLLOW-UP: ICD-10-CM

## 2019-05-09 DIAGNOSIS — R10.2 PELVIC PAIN: Primary | ICD-10-CM

## 2019-05-09 LAB
C TRACH RRNA CVX QL NAA+PROBE: NEGATIVE
N GONORRHOEA RRNA SPEC QL NAA+PROBE: NEGATIVE
TRICHOMONAS VAGINALIS PCR: NEGATIVE

## 2019-05-09 PROCEDURE — 99213 OFFICE O/P EST LOW 20 MIN: CPT | Performed by: NURSE PRACTITIONER

## 2019-05-09 RX ORDER — TRAMADOL HYDROCHLORIDE 50 MG/1
50 TABLET ORAL EVERY 6 HOURS PRN
Qty: 12 TABLET | Refills: 0 | Status: SHIPPED | OUTPATIENT
Start: 2019-05-09 | End: 2019-05-12

## 2019-05-09 NOTE — PROGRESS NOTES
Subjective   Narda Sheehan is a 33 y.o. female.     Ms. Sheehan is a 33-year-old female who presents today for ER follow-up related to recurrent pelvic pain.  She had a D&C approximately 4 weeks ago and has recovered well.  However she continues to have intermittent left-sided abdominal/pelvic pain.  She denies any abnormal bleeding.  Furthermore she denies fever, chills, nausea, vomiting, diarrhea, hematuria, dysuria.  When evaluated at the emergency room the other day it was noted that she had some uterine fibroids on the right side and a small hypoechoic lesion in her uterus.  It was also noted that she had a calcified lesion on flat and upright x-ray.  She had postop follow-up with her OB/GYN yesterday who stated that she did not believe that her pain was gynecological in nature.  She subsequently ordered a CT of her abdomen and pelvis for further evaluation.  She is not taking any prescription or over-the-counter pain medication.  Her pain is intermittent in nature with no pattern or exacerbating factors noted.         The following portions of the patient's history were reviewed and updated as appropriate: allergies, current medications, past family history, past medical history, past social history, past surgical history and problem list.    Review of Systems   Constitutional: Negative for activity change, appetite change, fatigue, unexpected weight gain and unexpected weight loss.   HENT: Negative for congestion, sore throat, trouble swallowing and voice change.    Eyes: Negative.    Respiratory: Negative for cough, chest tightness, shortness of breath and wheezing.    Cardiovascular: Negative for chest pain, palpitations and leg swelling.   Gastrointestinal: Positive for abdominal pain. Negative for constipation, diarrhea, nausea and vomiting.   Endocrine: Negative.    Genitourinary: Positive for pelvic pain. Negative for dysuria.   Musculoskeletal: Negative for arthralgias and myalgias.   Skin: Negative  for rash.   Allergic/Immunologic: Negative.    Neurological: Negative.    Hematological: Negative.    Psychiatric/Behavioral: Negative.        Objective   Physical Exam   Constitutional: She is oriented to person, place, and time. She appears well-developed and well-nourished. No distress.   HENT:   Head: Normocephalic and atraumatic.   Eyes: Conjunctivae are normal.   Neck: Normal range of motion.   Cardiovascular: Normal rate, regular rhythm and normal heart sounds.   Pulmonary/Chest: Effort normal and breath sounds normal. No respiratory distress. She has no wheezes. She has no rales.   Abdominal: Soft. Normal appearance and bowel sounds are normal. She exhibits no distension and no mass. There is tenderness in the left lower quadrant. There is no rebound, no guarding and no CVA tenderness. No hernia.       Musculoskeletal: Normal range of motion. She exhibits no edema or tenderness.   Neurological: She is alert and oriented to person, place, and time.   Skin: Skin is warm and dry. No rash noted. She is not diaphoretic. No erythema. No pallor.   Psychiatric: She has a normal mood and affect. Her behavior is normal. Judgment and thought content normal.   Nursing note and vitals reviewed.        Assessment/Plan   Narda was seen today for follow-up.    Diagnoses and all orders for this visit:    Pelvic pain    Hospital discharge follow-up    Other orders  -     traMADol (ULTRAM) 50 MG tablet; Take 1 tablet by mouth Every 6 (Six) Hours As Needed for Moderate Pain  for up to 3 days.    1.  Pelvic pain- patient has CT scheduled for this upcoming Monday and follow-up with OB/GYN next Wednesday.  Will provide pain management until that time.  Tramadol 50 mg 1 tablet every 6 hours as needed for pain.  3-day supply prescribed to patient.  [facility] #[id number] reviewed.  Patient instructed to present to the ER if she develops worsening symptoms.  Patient verbalized understanding of instruction and agrees with plan of  care.    2.  Follow-up with OB/GYN in 1 week as scheduled if symptoms worsen seek immediate follow-up care.            This document has been electronically signed by LOR Jerome on May 9, 2019 4:47 PM

## 2019-05-10 NOTE — PROGRESS NOTES
"  Chief Complaint   Patient presents with   • Post-op     Narda Sheehan is a 33 y.o. year old who is here for follow up after a hysteroscopy, D&C with findings of endocervical and uterine polyps. Pathology benign.  She is without complaints. No fever, chills, n/v.  We discussed options to treat menometrorrhagia in the future or to wait and see if it was all secondary to polyps.  Patient would like no further treatment at this time for this issue.   Patient's last menstrual period was 2019.  She presents with a chief complaint of continual LLQ pain which is not secondary to gynecologic issue.  Patient says it feels like a kidney stone and went to ER where she reports nothing was done to evaluate it further.         Past Medical History:   Diagnosis Date   • Acute pain    • Acute pharyngitis    • Aphthous ulcer of mouth    • Glossitis    • Hyperlipidemia    • Hypertension     no longer on medication   • Streptococcal sore throat    • Upper respiratory infection      Past Surgical History:   Procedure Laterality Date   •  SECTION     • D&C WITH SUCTION N/A 2019    Procedure: SUCTION DILATION AND CURETTAGE;  Surgeon: Evita Dominique MD;  Location: [facility];  Service: Obstetrics/Gynecology   • ENDOMETRIAL ABLATION     • HYSTEROSCOPY N/A 2019    Procedure: HYSTEROSCOPY,;  Surgeon: Evita Dominique MD;  Location: [facility];  Service: Obstetrics/Gynecology   • TUBAL ABDOMINAL LIGATION         Current Outpatient Medications:   •  traMADol (ULTRAM) 50 MG tablet, Take 1 tablet by mouth Every 6 (Six) Hours As Needed for Moderate Pain  for up to 3 days., Disp: 12 tablet, Rfl: 0  No Known Allergies  Social History    Tobacco Use      Smoking status: Never Smoker      Smokeless tobacco: Never Used    Review of Systems all systems reviewed and were negative with exception of LLQ pain which has been present since before her surgery.    /73   Ht 165.1 cm (65\")   Wt 116 kg (255 lb)   LMP " 04/09/2019   BMI 42.43 kg/m²     General:  well developed; well nourished  no acute distress  appears stated age   Thyroid: not examined   Lungs:  breathing is unlabored   Heart:  Not performed.   Breasts:  Not performed.   Abdomen: Soft, nt/nd, no rebound or guarding, pain localized to LLQ   Pelvis: Not performed.     Lab Review   pathology reviewed      Imaging   No data reviewed    Assessment      Diagnosis Plan   1. Left lower quadrant pain  CT Abdomen Pelvis Without Contrast   2. Postop check            Plan   1. Expectant management for prior diagnosis or irregular bleeding/DUB  2. CT of abdomen and pelvis to evaluate for renal stones or bowel issue such as diverticulitis or other inflammatory disease process.  Will notify patient with result.         This note was electronically signed.    Evita Dominique MD  May 10, 2019

## 2019-05-13 ENCOUNTER — HOSPITAL ENCOUNTER (OUTPATIENT)
Dept: CT IMAGING | Facility: HOSPITAL | Age: 34
Discharge: HOME | End: 2019-05-13
Admitting: OBSTETRICS & GYNECOLOGY

## 2019-05-13 DIAGNOSIS — R10.32 LEFT LOWER QUADRANT PAIN: ICD-10-CM

## 2019-05-13 PROCEDURE — 74176 CT ABD & PELVIS W/O CONTRAST: CPT

## 2019-05-14 ENCOUNTER — TELEPHONE (OUTPATIENT)
Dept: OBSTETRICS AND GYNECOLOGY | Facility: CLINIC | Age: 34
End: 2019-05-14

## 2019-05-14 DIAGNOSIS — R93.2 ABNORMAL CT SCAN, GALLBLADDER: Primary | ICD-10-CM

## 2019-05-14 NOTE — TELEPHONE ENCOUNTER
----- Message from Evita Dominique MD sent at 5/14/2019  2:55 PM CDT -----  Please let patient know there were no kidney stones or bowel issues on the CT scan although the gallbladder may be abnormal.  I recommend she have a HIDA scan.  Could you please set her up for this.

## 2019-05-14 NOTE — TELEPHONE ENCOUNTER
Called gave pt results of ct scan and recommendations for HIDA scan pt verbalized understanding and will set up HIDA scan and we will contact her with the results

## 2019-05-22 ENCOUNTER — HOSPITAL ENCOUNTER (OUTPATIENT)
Dept: NUCLEAR MEDICINE | Facility: HOSPITAL | Age: 34
End: 2019-05-22

## 2019-11-13 ENCOUNTER — OFFICE VISIT (OUTPATIENT)
Dept: OBSTETRICS AND GYNECOLOGY | Facility: CLINIC | Age: 34
End: 2019-11-13

## 2019-11-13 VITALS
SYSTOLIC BLOOD PRESSURE: 124 MMHG | HEIGHT: 65 IN | DIASTOLIC BLOOD PRESSURE: 82 MMHG | BODY MASS INDEX: 42.32 KG/M2 | WEIGHT: 254 LBS

## 2019-11-13 DIAGNOSIS — D25.1 INTRAMURAL LEIOMYOMA OF UTERUS: ICD-10-CM

## 2019-11-13 DIAGNOSIS — R93.89 ABNORMAL PELVIC ULTRASOUND: ICD-10-CM

## 2019-11-13 DIAGNOSIS — I34.1 MVP (MITRAL VALVE PROLAPSE): Primary | ICD-10-CM

## 2019-11-13 DIAGNOSIS — N93.9 ABNORMAL UTERINE BLEEDING (AUB): ICD-10-CM

## 2019-11-13 PROCEDURE — 99213 OFFICE O/P EST LOW 20 MIN: CPT | Performed by: OBSTETRICS & GYNECOLOGY

## 2019-11-13 NOTE — PROGRESS NOTES
Narda Sheehan is a 34 y.o. y/o female.     Chief Complaint: Follow-up on abnormal uterine bleeding.    HPI:   34 y.o. No obstetric history on file..  Patient's last menstrual period was 10/31/2019..  Patient presents to discuss abnormal uterine bleeding.  States that she had an ablation done a few years ago, and is now having heavy bleeding with her periods and sometimes intamenstrual bleeding.  Patient states bleeding is very heavy and she is soaking through pads and her close.  She states that bleeding is significantly affecting her life.  Patient was also noted to have a fibroid on recent ultrasound.  Discussed with patient possible treatment options to include doing nothing, medical therapy or surgery.  Patient does not want to undergo medical therapy and given that she has now failed an ablation and does have a fibroid uterus I feel that she is a good candidate for a hysterectomy.  Patient felt comfortable with this plan.  We will schedule for surgery on 16 December 2019.     Review of Systems   Constitutional: Negative for chills, fatigue and fever.   HENT: Negative for sore throat.    Eyes: Negative for visual disturbance.   Respiratory: Negative for cough, shortness of breath and wheezing.    Cardiovascular: Negative for chest pain, palpitations and leg swelling.   Gastrointestinal: Negative for abdominal pain, diarrhea, nausea and vomiting.   Genitourinary: Negative for dysuria, flank pain, frequency, vaginal bleeding, vaginal discharge and vaginal pain.   Neurological: Negative for syncope, light-headedness and headaches.   Psychiatric/Behavioral: Negative for dysphoric mood and suicidal ideas. The patient is not nervous/anxious.         The following portions of the patient's history were reviewed and updated as appropriate: allergies, current medications, past family history, past medical history, past social history, past surgical history and problem list.    No Known Allergies     Prior to  "Admission medications    Not on File        The patient has a family history of   Family History   Problem Relation Age of Onset   • Cancer Mother    • Leukemia Mother    • Hypertension Father    • Multiple sclerosis Father    • No Known Problems Brother    • Asthma Daughter    • Arnold-Chiari malformation Son    • No Known Problems Maternal Grandmother    • No Known Problems Maternal Grandfather         Past Medical History:   Diagnosis Date   • Acute pain    • Acute pharyngitis    • Aphthous ulcer of mouth    • Glossitis    • Hyperlipidemia    • Hypertension     no longer on medication   • Streptococcal sore throat    • Upper respiratory infection         OB History     No data available           Social History     Socioeconomic History   • Marital status:      Spouse name: Not on file   • Number of children: Not on file   • Years of education: Not on file   • Highest education level: Not on file   Tobacco Use   • Smoking status: Never Smoker   • Smokeless tobacco: Never Used   Substance and Sexual Activity   • Alcohol use: No   • Drug use: No   • Sexual activity: Defer     Partners: Male     Birth control/protection: Surgical        Past Surgical History:   Procedure Laterality Date   •  SECTION     • D&C WITH SUCTION N/A 2019    Procedure: SUCTION DILATION AND CURETTAGE;  Surgeon: Evita Dominique MD;  Location: [facility];  Service: Obstetrics/Gynecology   • ENDOMETRIAL ABLATION     • HYSTEROSCOPY N/A 2019    Procedure: HYSTEROSCOPY,;  Surgeon: Evita Dominique MD;  Location: [facility];  Service: Obstetrics/Gynecology   • TUBAL ABDOMINAL LIGATION          Patient Active Problem List   Diagnosis   • Palpitations   • MVP (mitral valve prolapse)   • Abnormal pelvic ultrasound        Documented Vitals    19 0934   BP: 124/82   Weight: 115 kg (254 lb)   Height: 165.1 cm (65\")   PainSc: 0-No pain        Body mass index is 42.27 kg/m².    Physical Exam   Constitutional: She is " oriented to person, place, and time. She appears well-developed and well-nourished. No distress.   HENT:   Head: Normocephalic.   Musculoskeletal: Normal range of motion.   Neurological: She is alert and oriented to person, place, and time.   Skin: Skin is warm and dry. She is not diaphoretic.   Psychiatric: She has a normal mood and affect. Her behavior is normal. Judgment and thought content normal.   Vitals reviewed.      Laboratory Data:   Lab Results - Last 18 Months   Lab Units 05/07/19  1208 04/18/19  1250 03/22/19 0927 02/28/19  1029   GLUCOSE mg/dL 91 120* 93 95   BUN mg/dL 10 11 12 8   CREATININE mg/dL 0.78 0.73 0.73 0.74   SODIUM mmol/L 142 142 137 138   POTASSIUM mmol/L 3.5 3.9 3.9 3.7   CHLORIDE mmol/L 104 104 101 103   CO2 mmol/L 26.0 24.0 27.0 24.0   CALCIUM mg/dL 9.4 9.4 9.2 9.5   TOTAL PROTEIN g/dL 7.5 7.0 7.6 7.7   ALBUMIN g/dL 3.90 4.10 4.10 4.30   ALT (SGPT) U/L 13 15 25 20   AST (SGOT) U/L 15 15 45* 22   ALK PHOS U/L 92 87 74 94   BILIRUBIN mg/dL 0.6 0.3 0.5 0.5   EGFR IF NONAFRICN AM mL/min/1.73 86 92 92 91   GLOBULIN gm/dL 3.6 2.9 3.5 3.4   A/G RATIO g/dL 1.1 1.4 1.2 1.3   BUN / CREAT RATIO  12.8 15.1 16.4 10.8   ANION GAP mmol/L 12.0 14.0 9.0 11.0     Lab Results - Last 18 Months   Lab Units 05/07/19  1208 04/18/19  1250 03/22/19 0927 02/28/19  1029   WBC 10*3/mm3 10.05 8.99 10.18 11.58*   RBC 10*6/mm3 5.12 5.09 5.11 5.07   HEMOGLOBIN g/dL 14.2 14.1 14.2 14.5   HEMATOCRIT % 42.2 42.1 42.0 41.1   MCV fL 82.4 82.7 82.2 81.1   MCH pg 27.7 27.7 27.8 28.6   MCHC g/dL 33.6 33.5 33.8 35.3   RDW % 12.6 12.7 12.8 12.5   RDW-SD fl 38.0 38.2 37.6 36.5*   MPV fL 9.7 9.9 9.8 9.6   PLATELETS 10*3/mm3 278 310 301 303     Lab Results - Last 18 Months   Lab Units 05/07/19  1208   HCG QUALITATIVE  Negative       Assessment   Patient with abnormal uterine bleeding and fibroid uterus status post ablation that has now failed.  Desiring definitive management of her bleeding that has become a life altering.  Plan  for patient undergo scopic assisted vaginal hysterectomy on 16 December.  Plan to have patient return to see me in 2 weeks for final preop.  Patient does have mitral valve prolapse and will recommend that she get cardiac clearance prior to surgery.  Patient to return sooner as needed.     Diagnosis Plan   1. MVP (mitral valve prolapse)     2. Abnormal pelvic ultrasound     3. Abnormal uterine bleeding (AUB)     4. Intramural leiomyoma of uterus           This document has been electronically signed by Javier Turner DO on November 13, 2019 11:54 AM

## 2019-11-14 ENCOUNTER — TELEPHONE (OUTPATIENT)
Dept: CARDIOLOGY | Facility: CLINIC | Age: 34
End: 2019-11-14

## 2019-11-14 NOTE — TELEPHONE ENCOUNTER
Have left [facility] a VM to call me informing her Dr. Gloria wishes to see her before clearing.  I stated I could work her in on Nov 20 but she needs to call me.     ----- Message from Kirstin Morin sent at 11/13/2019  2:04 PM CST -----  This patient has sent a message via [employer] asking for an appt with Dr Gloria for cardiac clearance.  She was seen in April this year, and is having a hysterectomy on Dec. 16. I wasn't sure if she needs an appointment or if the doctor would just add an addendum to her chart note.  Please let me know how to proceed.  Thank you

## 2019-11-27 ENCOUNTER — OFFICE VISIT (OUTPATIENT)
Dept: OBSTETRICS AND GYNECOLOGY | Facility: CLINIC | Age: 34
End: 2019-11-27

## 2019-11-27 ENCOUNTER — OFFICE VISIT (OUTPATIENT)
Dept: CARDIOLOGY | Facility: CLINIC | Age: 34
End: 2019-11-27

## 2019-11-27 VITALS
SYSTOLIC BLOOD PRESSURE: 140 MMHG | DIASTOLIC BLOOD PRESSURE: 100 MMHG | OXYGEN SATURATION: 98 % | HEIGHT: 65 IN | HEART RATE: 104 BPM | WEIGHT: 249 LBS | BODY MASS INDEX: 41.48 KG/M2

## 2019-11-27 VITALS
HEIGHT: 65 IN | BODY MASS INDEX: 41.48 KG/M2 | SYSTOLIC BLOOD PRESSURE: 140 MMHG | WEIGHT: 249 LBS | DIASTOLIC BLOOD PRESSURE: 100 MMHG

## 2019-11-27 DIAGNOSIS — R00.2 PALPITATIONS: Primary | ICD-10-CM

## 2019-11-27 DIAGNOSIS — R93.89 ABNORMAL PELVIC ULTRASOUND: ICD-10-CM

## 2019-11-27 DIAGNOSIS — I34.1 MVP (MITRAL VALVE PROLAPSE): ICD-10-CM

## 2019-11-27 DIAGNOSIS — D25.9 UTERINE LEIOMYOMA, UNSPECIFIED LOCATION: ICD-10-CM

## 2019-11-27 DIAGNOSIS — R93.89 ABNORMAL PELVIC ULTRASOUND: Primary | ICD-10-CM

## 2019-11-27 DIAGNOSIS — N93.9 ABNORMAL UTERINE BLEEDING (AUB): ICD-10-CM

## 2019-11-27 PROCEDURE — 99213 OFFICE O/P EST LOW 20 MIN: CPT | Performed by: INTERNAL MEDICINE

## 2019-11-27 PROCEDURE — 99213 OFFICE O/P EST LOW 20 MIN: CPT | Performed by: OBSTETRICS & GYNECOLOGY

## 2019-11-27 RX ORDER — SODIUM CHLORIDE 0.9 % (FLUSH) 0.9 %
10 SYRINGE (ML) INJECTION AS NEEDED
Status: CANCELLED | OUTPATIENT
Start: 2019-12-09

## 2019-11-27 RX ORDER — BUPIVACAINE HCL/0.9 % NACL/PF 0.1 %
2 PLASTIC BAG, INJECTION (ML) EPIDURAL ONCE
Status: CANCELLED | OUTPATIENT
Start: 2019-12-09 | End: 2019-11-27

## 2019-11-27 RX ORDER — SODIUM CHLORIDE, SODIUM LACTATE, POTASSIUM CHLORIDE, CALCIUM CHLORIDE 600; 310; 30; 20 MG/100ML; MG/100ML; MG/100ML; MG/100ML
125 INJECTION, SOLUTION INTRAVENOUS CONTINUOUS
Status: CANCELLED | OUTPATIENT
Start: 2019-12-09

## 2019-11-27 RX ORDER — SODIUM CHLORIDE 0.9 % (FLUSH) 0.9 %
3 SYRINGE (ML) INJECTION EVERY 12 HOURS SCHEDULED
Status: CANCELLED | OUTPATIENT
Start: 2019-12-09

## 2019-11-27 NOTE — PROGRESS NOTES
Narda Felix Ray  34 y.o. female    2019  1. Palpitations    2. MVP (mitral valve prolapse)    3. Abnormal pelvic ultrasound        History of Present Illness:  Body mass index is 41.44 kg/m². BMI is above normal parameters. Recommendations include: exercise counseling, nutrition counseling and referral to primary care.    33 years old patient presented for preop evaluation as she has abnormal pelvic ultrasound awaiting for hysterectomy with the previous history of D&C who had palpitation with a heart rate 120-130 bpm more than 6 months ago evaluate at that time with Dr. Hennessy with echo .history of mitral valve prolapse.  Echo reported ejection fractions 50-55% mild mitral valve prolapse with mitral valve thickening with a trace mitral and mild tricuspid regurgitation.  No further palpitation reported.  She is physically active and walk almost every day more than 2-3 mile without symptom of dizziness shortness of breath chest pain or claudications.  She is referred for preop evaluation as she is scheduled to have dilatation and curette for some uterine pathology showed a family history of cancer.  No chest pain reported.  Fortunately she does not smoke    SUBJECTIVE:    No Known Allergies      Past Medical History:   Diagnosis Date   • Acute pain    • Acute pharyngitis    • Aphthous ulcer of mouth    • Glossitis    • Hyperlipidemia    • Hypertension     no longer on medication   • Streptococcal sore throat    • Upper respiratory infection          Past Surgical History:   Procedure Laterality Date   •  SECTION     • D&C WITH SUCTION N/A 2019    Procedure: SUCTION DILATION AND CURETTAGE;  Surgeon: Evita Dominique MD;  Location: [facility];  Service: Obstetrics/Gynecology   • ENDOMETRIAL ABLATION     • HYSTEROSCOPY N/A 2019    Procedure: HYSTEROSCOPY,;  Surgeon: Evita Dominique MD;  Location: [facility];  Service: Obstetrics/Gynecology   • TUBAL ABDOMINAL LIGATION           Family  History   Problem Relation Age of Onset   • Cancer Mother    • Leukemia Mother    • Hypertension Father    • Multiple sclerosis Father    • No Known Problems Brother    • Asthma Daughter    • Arnold-Chiari malformation Son    • No Known Problems Maternal Grandmother    • No Known Problems Maternal Grandfather          Social History     Socioeconomic History   • Marital status:      Spouse name: Not on file   • Number of children: Not on file   • Years of education: Not on file   • Highest education level: Not on file   Tobacco Use   • Smoking status: Never Smoker   • Smokeless tobacco: Never Used   Substance and Sexual Activity   • Alcohol use: No   • Drug use: No   • Sexual activity: Defer     Partners: Male     Birth control/protection: Surgical         No current outpatient medications on file.     No current facility-administered medications for this visit.            Review of Systems:     Constitutional:  Denies recent weight loss, weight gain, fever or chills, no change in exercise tolerance.     HENT:  Denies any hearing loss, epistaxis, hoarseness, or difficulty speaking.     Eyes: Wears eyeglasses or contact lenses.    Respiratory:  Denies dyspnea with exertion,no cough, wheezing, or hemoptysis.     Cardiovascular: See H&P    Gastrointestinal:  Denies change in bowel habits, dyspepsia, ulcer disease, hematochezia, or melena.     Endocrine: Negative for cold intolerance, heat intolerance, polydipsia, polyphagia and polyuria. Denies any history of weight change, polydipsia, polyuria.     Genitourinary: Abnormal pelvic ultrasound waiting for hysterectomy      Musculoskeletal: Denies any history of arthritic symptoms or back problems.     Skin:  Denies any change in hair or nails, rashes, or skin lesions.     Allergic/Immunologic: Negative.  Negative for environmental allergies, food allergies and immunocompromised state.     Neurological:  Denies any history of recurrent headaches, strokes, TIA, or  "seizure disorder.     Hematological: Denies any food allergies, seasonal allergies, bleeding disorders, or lymphadenopathy.     Psychiatric/Behavioral: Denies any history of depression, substance abuse, or change in cognitive function.       OBJECTIVE:    /100   Pulse 104   Ht 165.1 cm (65\")   Wt 113 kg (249 lb)   LMP 10/31/2019   SpO2 98%   BMI 41.44 kg/m²       Physical Exam:     Constitutional: Cooperative, alert and oriented, well-developed, well-nourished, in no acute distress.     HENT:   Head: Normocephalic, normal hair patterns, no masses or tenderness.  Ears, Nose, and Throat: No gross abnormalities. No pallor or cyanosis. Dentition good.   Eyes: EOMS intact, PERRL, conjunctivae and lids unremarkable. Fundoscopic exam and visual fields not performed.   Neck: No palpable masses or adenopathy, no thyromegaly, no JVD, carotid pulses are full and equal bilaterally and without  Bruits.     Cardiovascular: Regular rhythm, S1 and S2 normal, no S3 or S4. Apical impulse not displaced. No murmurs, gallops, or rubs detected.     Pulmonary/Chest: Chest: normal symmetry, no tenderness to palpation, normal respiratory excursion, no intercostal retraction, no use of accessory muscles. Pulmonary: Normal breath sounds. No rales or rhonchi.    Abdominal: Abdomen soft, bowel sounds normoactive, no masses, no hepatosplenomegaly, non-tender, no bruits.     Musculoskeletal: No deformities, clubbing, cyanosis, erythema, or edema observed. There are no spinal abnormalities noted. Normal muscle strength and tone. Pulses full and equal in all extremities, no bruits auscultated.     Neurological: No gross motor or sensory deficits noted, affect appropriate, oriented to time, person, place.     Skin: Warm and dry to the touch, no apparent skin lesions or masses noted.     Psychiatric: She has a normal mood and affect. Her behavior is normal. Judgment and thought content normal.         Procedures      Lab Results "   Component Value Date    WBC 10.05 05/07/2019    HGB 14.2 05/07/2019    HCT 42.2 05/07/2019    MCV 82.4 05/07/2019     05/07/2019     Lab Results   Component Value Date    GLUCOSE 91 05/07/2019    BUN 10 05/07/2019    CREATININE 0.78 05/07/2019    EGFRIFNONA 86 05/07/2019    BCR 12.8 05/07/2019    CO2 26.0 05/07/2019    CALCIUM 9.4 05/07/2019    ALBUMIN 3.90 05/07/2019    AST 15 05/07/2019    ALT 13 05/07/2019     Lab Results   Component Value Date    CHOL 221 (H) 03/22/2019     Lab Results   Component Value Date    TRIG 191 03/22/2019     Lab Results   Component Value Date    HDL 38 (L) 03/22/2019     No components found for: LDLCALC  Lab Results   Component Value Date     (H) 03/22/2019     No results found for: HDLLDLRATIO  No components found for: CHOLHDL  Lab Results   Component Value Date    HGBA1C 5.5 03/22/2019     Lab Results   Component Value Date    TSH 1.620 03/22/2019           ASSESSMENT AND PLAN:  #1 preop evaluation waiting for hysterectomy   #2 mitral valve prolapse with trace mitral and mild tricuspid regurgitation   #3 palpitation and tachycardia with rate up to 120-130 bpm more than 5-month no further recurrence     Clinically, no sign of cardiac decompensation or ischemia at the time of evaluation and given the [facility] cardiac status with mets achieve more than 5 to 6 no furthe risk stratification need prior to the procedure.No further recurrence of palpitation and no further evaluation needed at this stage. Patient is low to moderaet risk. Given her BMP significance of low carb/low fat diet and graded exercise discussed.  Nataliassjose Laboy was seen today for follow-up.    Diagnoses and all orders for this visit:    Palpitations    MVP (mitral valve prolapse)    Abnormal pelvic ultrasound          Raul Gloria MD  11/27/2019  10:24 AM

## 2019-11-27 NOTE — PROGRESS NOTES
Narda Sheehan is a 34 y.o. y/o female.     Chief Complaint: Abnormal uterine bleeding and fibroid uterus here for preop    HPI:   34 y.o. No obstetric history on file..  Patient's last menstrual period was 10/31/2019..  Patient presents for preop appointment today for hysterectomy.  Patient was originally scheduled for 16 December, however  was recently diagnosed with colon cancer and is trying to move her surgery sooner.  We will plan to move surgery to 9 December.  Patient still continues to have abnormal bleeding.    Patient requests laparoscopic hysterectomy with bilateral salpingectomy and left oophorectomy. She understands the risk up to and including death. She understands the risk of serious urologic morbidity such as vesico vaginal fistula, damage to the ureter and or bladder with possible need for additional surgery and low possibility of nephrectomy and/or extended morbidity. She understands the risk of damage to bowel possible colostomy. She understands the risk of damage to bowel undetected at time of procedure with sepsis and/or need returned OR.  I discussed the risk of bleeding with the patient and possible risk of blood transfusion.  Blood products carry risk of HIV, infection, hepatitis, and transfusion reaction. Patient is willing to accept blood products. She understands the risk of delayed hemorrhage with possible need to return to the OR. She understands the risk of hematoma formation. She understands the risk of infection in the abdominal wall, pelvis, abdomen and other parts of the body. She understands the alternatives of medical therapy and the risks and alternatives. Her questions are answered at length. She understands that there is a  possibility that we may need to convert this to a total abdominal hysterectomy and she accepts this. Patient expressed understanding and desires to proceed with surgery.    Note from first visit:Patient presents to discuss abnormal uterine  bleeding.  States that she had an ablation done a few years ago, and is now having heavy bleeding with her periods and sometimes intamenstrual bleeding.  Patient states bleeding is very heavy and she is soaking through pads and her close.  She states that bleeding is significantly affecting her life.  Patient was also noted to have a fibroid on recent ultrasound.  Discussed with patient possible treatment options to include doing nothing, medical therapy or surgery.  Patient does not want to undergo medical therapy and given that she has now failed an ablation and does have a fibroid uterus I feel that she is a good candidate for a hysterectomy.  Patient felt comfortable with this plan.  We will schedule for surgery on 16 December 2019.     Review of Systems   Constitutional: Negative for chills, fatigue and fever.   HENT: Negative for sore throat.    Eyes: Negative for visual disturbance.   Respiratory: Negative for cough, shortness of breath and wheezing.    Cardiovascular: Negative for chest pain, palpitations and leg swelling.   Gastrointestinal: Negative for abdominal pain, diarrhea, nausea and vomiting.   Genitourinary: Positive for menstrual problem, pelvic pain and vaginal bleeding. Negative for dysuria, flank pain, frequency, vaginal discharge and vaginal pain.   Neurological: Negative for syncope, light-headedness and headaches.   Psychiatric/Behavioral: Negative for dysphoric mood and suicidal ideas. The patient is not nervous/anxious.         The following portions of the patient's history were reviewed and updated as appropriate: allergies, current medications, past family history, past medical history, past social history, past surgical history and problem list.    No Known Allergies     Prior to Admission medications    Not on File        The patient has a family history of   Family History   Problem Relation Age of Onset   • Cancer Mother    • Leukemia Mother    • Hypertension Father    • Multiple  "sclerosis Father    • No Known Problems Brother    • Asthma Daughter    • Arnold-Chiari malformation Son    • No Known Problems Maternal Grandmother    • No Known Problems Maternal Grandfather         Past Medical History:   Diagnosis Date   • Acute pain    • Acute pharyngitis    • Aphthous ulcer of mouth    • Glossitis    • Hyperlipidemia    • Hypertension     no longer on medication   • Streptococcal sore throat    • Upper respiratory infection         OB History     No data available           Social History     Socioeconomic History   • Marital status:      Spouse name: Not on file   • Number of children: Not on file   • Years of education: Not on file   • Highest education level: Not on file   Tobacco Use   • Smoking status: Never Smoker   • Smokeless tobacco: Never Used   Substance and Sexual Activity   • Alcohol use: No   • Drug use: No   • Sexual activity: Defer     Partners: Male     Birth control/protection: Surgical        Past Surgical History:   Procedure Laterality Date   •  SECTION     • D&C WITH SUCTION N/A 2019    Procedure: SUCTION DILATION AND CURETTAGE;  Surgeon: Evita Dominique MD;  Location: [facility];  Service: Obstetrics/Gynecology   • ENDOMETRIAL ABLATION     • HYSTEROSCOPY N/A 2019    Procedure: HYSTEROSCOPY,;  Surgeon: Evita Dominique MD;  Location: [facility];  Service: Obstetrics/Gynecology   • TUBAL ABDOMINAL LIGATION          Patient Active Problem List   Diagnosis   • Palpitations   • MVP (mitral valve prolapse)   • Abnormal pelvic ultrasound        Documented Vitals    19 0915   BP: 140/100   Weight: 113 kg (249 lb)   Height: 165.1 cm (65\")        Body mass index is 41.44 kg/m².    Physical Exam   Constitutional: She is oriented to person, place, and time. She appears well-developed and well-nourished. No distress.   HENT:   Head: Normocephalic.   Cardiovascular: Normal rate, regular rhythm, normal heart sounds and intact distal pulses.   No " murmur heard.  Pulmonary/Chest: Effort normal and breath sounds normal. No respiratory distress. She has no wheezes.   Abdominal: Soft. Bowel sounds are normal. She exhibits no distension and no mass. There is no tenderness. There is no rebound and no guarding.   Genitourinary: Vagina normal and uterus normal. No vaginal discharge found.   Musculoskeletal: Normal range of motion. She exhibits no edema, tenderness or deformity.   Neurological: She is alert and oriented to person, place, and time.   Skin: Skin is warm and dry. No erythema.   Psychiatric: She has a normal mood and affect. Her behavior is normal. Judgment and thought content normal.   Vitals reviewed.      Laboratory Data:   Lab Results - Last 18 Months   Lab Units 05/07/19  1208 04/18/19  1250 03/22/19  0927 02/28/19  1029   GLUCOSE mg/dL 91 120* 93 95   BUN mg/dL 10 11 12 8   CREATININE mg/dL 0.78 0.73 0.73 0.74   SODIUM mmol/L 142 142 137 138   POTASSIUM mmol/L 3.5 3.9 3.9 3.7   CHLORIDE mmol/L 104 104 101 103   CO2 mmol/L 26.0 24.0 27.0 24.0   CALCIUM mg/dL 9.4 9.4 9.2 9.5   TOTAL PROTEIN g/dL 7.5 7.0 7.6 7.7   ALBUMIN g/dL 3.90 4.10 4.10 4.30   ALT (SGPT) U/L 13 15 25 20   AST (SGOT) U/L 15 15 45* 22   ALK PHOS U/L 92 87 74 94   BILIRUBIN mg/dL 0.6 0.3 0.5 0.5   EGFR IF NONAFRICN AM mL/min/1.73 86 92 92 91   GLOBULIN gm/dL 3.6 2.9 3.5 3.4   A/G RATIO g/dL 1.1 1.4 1.2 1.3   BUN / CREAT RATIO  12.8 15.1 16.4 10.8   ANION GAP mmol/L 12.0 14.0 9.0 11.0     Lab Results - Last 18 Months   Lab Units 05/07/19  1208 04/18/19  1250 03/22/19  0927 02/28/19  1029   WBC 10*3/mm3 10.05 8.99 10.18 11.58*   RBC 10*6/mm3 5.12 5.09 5.11 5.07   HEMOGLOBIN g/dL 14.2 14.1 14.2 14.5   HEMATOCRIT % 42.2 42.1 42.0 41.1   MCV fL 82.4 82.7 82.2 81.1   MCH pg 27.7 27.7 27.8 28.6   MCHC g/dL 33.6 33.5 33.8 35.3   RDW % 12.6 12.7 12.8 12.5   RDW-SD fl 38.0 38.2 37.6 36.5*   MPV fL 9.7 9.9 9.8 9.6   PLATELETS 10*3/mm3 278 310 301 303     Lab Results - Last 18 Months   Lab Units  05/07/19  1208   HCG QUALITATIVE  Negative     EXAMINATION:  Ultrasound, pelvic     CLINICAL INDICATION / HISTORY:  LEFT SUPRAPUBIC PAIN     COMPARISON:  none     TECHNIQUE:  Transvaginal     FULL RESULTS / FINDINGS:        Transvaginal:    - uterus: normal size, measures 9.18 x 5.13 x 5.92 cm  Uterine fundus oval anechoic structures with good through  acoustic transmission which measures 5.7 mm in greatest diameter.                   endometrial canal: In the region of the cervix,  there is a small focus of endometrial thickening and  hypoechogenicity which measures 0.89 x 0.56 cm. Otherwise the  endometrium is unremarkable with maximum width of 0.6 cm.    - ovaries:        - right:  normal size, measuring 3.1 x 1.7 x 2.2 cm                    echotexture:   physiologic , no complex / solid  mass(es), normal color-flow blood flow        - left:    normal size, measuring 2.6 x 2.2 x 1.9 cm                     echotexture:   physiologic , no complex /  solid mass(es), normal color-flow blood flow    - cul-de-sac: no/physiologic amount of free fluid     Misc:     IMPRESSION:  CONCLUSION:    1.  In the region of the cervix, there is a small focus of  endometrial thickening and hypoechogenicity which measures 0.89 x  0.56 cm. This is of uncertain etiology. This may represent normal  endometrial changes from menstrual cycle versus retained products  of conception versus intrauterine blood clot. Less likely  etiologies could be early cervical carcinoma. Recommend GYN  consultation and consideration of biopsy.  2.  Uterine fundus oval anechoic structures with good through  acoustic transmission which measures 5.7 mm in greatest diameter  consistent with small cyst in this region. This would be  suspicious for adenomyosis.  3.  Otherwise unremarkable pelvic ultrasound.     Electronically signed by:  Shankar Amaya MD  2/28/2019 10:57 AM [facility]  Workstation: [id number]    Assessment   Patient with abnormal uterine bleeding status post  ablation in the past now with fibroid uterus.  And failed ablation.  Patient desiring definitive management of bleeding at this time.  Plan for her to undergo laparoscopic assisted vaginal hysterectomy with bilateral salpingectomy and then left oophorectomy on 9 December.  Patient to follow-up 1 week after surgery for postop.  Sooner as needed.     Diagnosis Plan   1. Abnormal pelvic ultrasound  Case Request    sodium chloride 0.9 % flush 3 mL    sodium chloride 0.9 % flush 10 mL    lactated ringers infusion    CBC and Differential    Comprehensive Metabolic Panel    ceFAZolin (ANCEF) 2 g in sodium chloride 0.9 % 100 mL IVPB    Case Request   2. Abnormal uterine bleeding (AUB)     3. Uterine leiomyoma, unspecified location           This document has been electronically signed by Javier Turner DO on November 27, 2019 9:49 AM

## 2019-12-03 ENCOUNTER — APPOINTMENT (OUTPATIENT)
Dept: PREADMISSION TESTING | Facility: HOSPITAL | Age: 34
End: 2019-12-03

## 2019-12-03 VITALS
BODY MASS INDEX: 41.21 KG/M2 | SYSTOLIC BLOOD PRESSURE: 156 MMHG | OXYGEN SATURATION: 98 % | HEIGHT: 65 IN | DIASTOLIC BLOOD PRESSURE: 93 MMHG | RESPIRATION RATE: 18 BRPM | HEART RATE: 69 BPM | WEIGHT: 247.36 LBS

## 2019-12-03 DIAGNOSIS — R93.89 ABNORMAL PELVIC ULTRASOUND: ICD-10-CM

## 2019-12-03 LAB
ALBUMIN SERPL-MCNC: 4.4 G/DL (ref 3.5–5.2)
ALBUMIN/GLOB SERPL: 1.4 G/DL
ALP SERPL-CCNC: 93 U/L (ref 39–117)
ALT SERPL W P-5'-P-CCNC: 13 U/L (ref 1–33)
ANION GAP SERPL CALCULATED.3IONS-SCNC: 10 MMOL/L (ref 5–15)
AST SERPL-CCNC: 14 U/L (ref 1–32)
BASOPHILS # BLD AUTO: 0.06 10*3/MM3 (ref 0–0.2)
BASOPHILS NFR BLD AUTO: 0.7 % (ref 0–1.5)
BILIRUB SERPL-MCNC: 0.4 MG/DL (ref 0.2–1.2)
BUN BLD-MCNC: 11 MG/DL (ref 6–20)
BUN/CREAT SERPL: 14.1 (ref 7–25)
CALCIUM SPEC-SCNC: 9.9 MG/DL (ref 8.6–10.5)
CHLORIDE SERPL-SCNC: 105 MMOL/L (ref 98–107)
CO2 SERPL-SCNC: 26 MMOL/L (ref 22–29)
CREAT BLD-MCNC: 0.78 MG/DL (ref 0.57–1)
DEPRECATED RDW RBC AUTO: 37.2 FL (ref 37–54)
EOSINOPHIL # BLD AUTO: 0.23 10*3/MM3 (ref 0–0.4)
EOSINOPHIL NFR BLD AUTO: 2.7 % (ref 0.3–6.2)
ERYTHROCYTE [DISTWIDTH] IN BLOOD BY AUTOMATED COUNT: 12.4 % (ref 12.3–15.4)
GFR SERPL CREATININE-BSD FRML MDRD: 85 ML/MIN/1.73
GLOBULIN UR ELPH-MCNC: 3.1 GM/DL
GLUCOSE BLD-MCNC: 94 MG/DL (ref 65–99)
HCT VFR BLD AUTO: 42.3 % (ref 34–46.6)
HGB BLD-MCNC: 14.4 G/DL (ref 12–15.9)
IMM GRANULOCYTES # BLD AUTO: 0.03 10*3/MM3 (ref 0–0.05)
IMM GRANULOCYTES NFR BLD AUTO: 0.4 % (ref 0–0.5)
LYMPHOCYTES # BLD AUTO: 2.79 10*3/MM3 (ref 0.7–3.1)
LYMPHOCYTES NFR BLD AUTO: 32.9 % (ref 19.6–45.3)
MCH RBC QN AUTO: 28.2 PG (ref 26.6–33)
MCHC RBC AUTO-ENTMCNC: 34 G/DL (ref 31.5–35.7)
MCV RBC AUTO: 82.9 FL (ref 79–97)
MONOCYTES # BLD AUTO: 0.54 10*3/MM3 (ref 0.1–0.9)
MONOCYTES NFR BLD AUTO: 6.4 % (ref 5–12)
NEUTROPHILS # BLD AUTO: 4.84 10*3/MM3 (ref 1.7–7)
NEUTROPHILS NFR BLD AUTO: 56.9 % (ref 42.7–76)
NRBC BLD AUTO-RTO: 0 /100 WBC (ref 0–0.2)
PLATELET # BLD AUTO: 289 10*3/MM3 (ref 140–450)
PMV BLD AUTO: 10 FL (ref 6–12)
POTASSIUM BLD-SCNC: 4.1 MMOL/L (ref 3.5–5.2)
PROT SERPL-MCNC: 7.5 G/DL (ref 6–8.5)
RBC # BLD AUTO: 5.1 10*6/MM3 (ref 3.77–5.28)
SODIUM BLD-SCNC: 141 MMOL/L (ref 136–145)
WBC NRBC COR # BLD: 8.49 10*3/MM3 (ref 3.4–10.8)

## 2019-12-03 PROCEDURE — 85025 COMPLETE CBC W/AUTO DIFF WBC: CPT | Performed by: OBSTETRICS & GYNECOLOGY

## 2019-12-03 PROCEDURE — 36415 COLL VENOUS BLD VENIPUNCTURE: CPT

## 2019-12-03 PROCEDURE — 93005 ELECTROCARDIOGRAM TRACING: CPT

## 2019-12-03 PROCEDURE — 80053 COMPREHEN METABOLIC PANEL: CPT | Performed by: OBSTETRICS & GYNECOLOGY

## 2019-12-03 PROCEDURE — 93010 ELECTROCARDIOGRAM REPORT: CPT | Performed by: INTERNAL MEDICINE

## 2019-12-03 NOTE — DISCHARGE INSTRUCTIONS
[facility]  Pre-op Information and Guidelines    You will be called after 2 p.m. the day before your surgery (Friday for Monday surgery) and notified of your time for arrival and approximate surgery time.  If you have not received a call by 4P.M., please contact Same Day Surgery at [phone] of if outside [facility] call [phone].    Please Follow these Important Safety Guidelines:    • The morning of your procedure, take only the medications listed below with   A sip of water:_____________________________________________       ______________________________________________    • DO NOT eat or drink anything after 12:00 midnight the night before surgery  Specific instructions concerning drinking clear liquids will be discussed during  the pre-surgery instruction call the day before your surgery.    • If you take a blood thinner (ex. Plavix, Coumadin, aspirin), ask your doctor when to stop it before surgery  STOP DATE: _________________    • Only 2 visitors are allowed in patient rooms at a time  Your visitors will be asked to wait in the lobby until the admission process is complete with the exception of a parent with a child and patients in need of special assistance.    • YOU CANNOT DRIVE YOURSELF HOME  You must be accompanied by someone who will be responsible for driving you home after surgery and for your care at home.    • DO NOT chew gum, use breath mints, hard candy, or smoke the day of surgery  • DO NOT drink alcohol for at least 24 hours before your surgery  • DO NOT wear any jewelry and remove all body piercing before coming to the hospital  • DO NOT wear make-up to the hospital  • If you are having surgery on an extremity (arm/leg/foot) remove nail polish/artificial nails on the surgical side  • Clothing, glasses, contacts, dentures, and hairpieces must be removed before surgery  • Bathe the night before or the morning of your surgery and do not use powders/lotions on  skin.

## 2019-12-08 ENCOUNTER — ANESTHESIA EVENT (OUTPATIENT)
Dept: PERIOP | Facility: HOSPITAL | Age: 34
End: 2019-12-08

## 2019-12-09 ENCOUNTER — ANESTHESIA (OUTPATIENT)
Dept: PERIOP | Facility: HOSPITAL | Age: 34
End: 2019-12-09

## 2019-12-09 ENCOUNTER — HOSPITAL ENCOUNTER (OUTPATIENT)
Facility: HOSPITAL | Age: 34
Discharge: HOME | End: 2019-12-10
Attending: OBSTETRICS & GYNECOLOGY | Admitting: OBSTETRICS & GYNECOLOGY

## 2019-12-09 DIAGNOSIS — R93.89 ABNORMAL PELVIC ULTRASOUND: ICD-10-CM

## 2019-12-09 LAB
ABO GROUP BLD: NORMAL
BLD GP AB SCN SERPL QL: NEGATIVE
HCG SERPL QL: NEGATIVE
Lab: NORMAL
RH BLD: POSITIVE
T&S EXPIRATION DATE: NORMAL

## 2019-12-09 PROCEDURE — 86901 BLOOD TYPING SEROLOGIC RH(D): CPT | Performed by: OBSTETRICS & GYNECOLOGY

## 2019-12-09 PROCEDURE — 25010000002 KETOROLAC TROMETHAMINE PER 15 MG: Performed by: OBSTETRICS & GYNECOLOGY

## 2019-12-09 PROCEDURE — 25010000002 ONDANSETRON PER 1 MG: Performed by: NURSE ANESTHETIST, CERTIFIED REGISTERED

## 2019-12-09 PROCEDURE — 25010000002 CEFAZOLIN PER 500 MG: Performed by: OBSTETRICS & GYNECOLOGY

## 2019-12-09 PROCEDURE — 84703 CHORIONIC GONADOTROPIN ASSAY: CPT | Performed by: OBSTETRICS & GYNECOLOGY

## 2019-12-09 PROCEDURE — 25010000002 KETOROLAC TROMETHAMINE PER 15 MG: Performed by: NURSE ANESTHETIST, CERTIFIED REGISTERED

## 2019-12-09 PROCEDURE — 25010000002 PROPOFOL 10 MG/ML EMULSION: Performed by: NURSE ANESTHETIST, CERTIFIED REGISTERED

## 2019-12-09 PROCEDURE — 88307 TISSUE EXAM BY PATHOLOGIST: CPT | Performed by: OBSTETRICS & GYNECOLOGY

## 2019-12-09 PROCEDURE — 25010000002 SUCCINYLCHOLINE PER 20 MG: Performed by: NURSE ANESTHETIST, CERTIFIED REGISTERED

## 2019-12-09 PROCEDURE — 25010000002 NEOSTIGMINE 4 MG/4ML SOLUTION PREFILLED SYRINGE: Performed by: NURSE ANESTHETIST, CERTIFIED REGISTERED

## 2019-12-09 PROCEDURE — 88307 TISSUE EXAM BY PATHOLOGIST: CPT | Performed by: PATHOLOGY

## 2019-12-09 PROCEDURE — 25010000002 MIDAZOLAM PER 1 MG: Performed by: NURSE ANESTHETIST, CERTIFIED REGISTERED

## 2019-12-09 PROCEDURE — 25010000002 DEXAMETHASONE PER 1 MG: Performed by: NURSE ANESTHETIST, CERTIFIED REGISTERED

## 2019-12-09 PROCEDURE — 25010000002 HYDROMORPHONE 1 MG/ML SOLUTION: Performed by: NURSE ANESTHETIST, CERTIFIED REGISTERED

## 2019-12-09 PROCEDURE — 25010000002 FENTANYL CITRATE (PF) 100 MCG/2ML SOLUTION: Performed by: NURSE ANESTHETIST, CERTIFIED REGISTERED

## 2019-12-09 PROCEDURE — 86900 BLOOD TYPING SEROLOGIC ABO: CPT | Performed by: OBSTETRICS & GYNECOLOGY

## 2019-12-09 PROCEDURE — 25010000002 PROMETHAZINE PER 50 MG: Performed by: OBSTETRICS & GYNECOLOGY

## 2019-12-09 PROCEDURE — 25010000002 ONDANSETRON PER 1 MG: Performed by: OBSTETRICS & GYNECOLOGY

## 2019-12-09 PROCEDURE — 86850 RBC ANTIBODY SCREEN: CPT | Performed by: OBSTETRICS & GYNECOLOGY

## 2019-12-09 PROCEDURE — 58552 LAPARO-VAG HYST INCL T/O: CPT | Performed by: OBSTETRICS & GYNECOLOGY

## 2019-12-09 DEVICE — ABSORBABLE RELOAD
Type: IMPLANTABLE DEVICE | Status: FUNCTIONAL
Brand: V-LOC 180

## 2019-12-09 RX ORDER — ROCURONIUM BROMIDE 10 MG/ML
INJECTION, SOLUTION INTRAVENOUS AS NEEDED
Status: DISCONTINUED | OUTPATIENT
Start: 2019-12-09 | End: 2019-12-09 | Stop reason: SURG

## 2019-12-09 RX ORDER — ONDANSETRON 4 MG/1
4 TABLET, FILM COATED ORAL EVERY 6 HOURS PRN
Status: DISCONTINUED | OUTPATIENT
Start: 2019-12-09 | End: 2019-12-10 | Stop reason: HOSPADM

## 2019-12-09 RX ORDER — ZOLPIDEM TARTRATE 5 MG/1
5 TABLET ORAL NIGHTLY PRN
Status: DISCONTINUED | OUTPATIENT
Start: 2019-12-09 | End: 2019-12-10 | Stop reason: HOSPADM

## 2019-12-09 RX ORDER — SODIUM CHLORIDE, SODIUM GLUCONATE, SODIUM ACETATE, POTASSIUM CHLORIDE, AND MAGNESIUM CHLORIDE 526; 502; 368; 37; 30 MG/100ML; MG/100ML; MG/100ML; MG/100ML; MG/100ML
INJECTION, SOLUTION INTRAVENOUS CONTINUOUS PRN
Status: DISCONTINUED | OUTPATIENT
Start: 2019-12-09 | End: 2019-12-09 | Stop reason: SURG

## 2019-12-09 RX ORDER — ACETAMINOPHEN 325 MG/1
650 TABLET ORAL ONCE AS NEEDED
Status: DISCONTINUED | OUTPATIENT
Start: 2019-12-09 | End: 2019-12-09 | Stop reason: HOSPADM

## 2019-12-09 RX ORDER — PROMETHAZINE HYDROCHLORIDE 25 MG/ML
12.5 INJECTION, SOLUTION INTRAMUSCULAR; INTRAVENOUS ONCE AS NEEDED
Status: DISCONTINUED | OUTPATIENT
Start: 2019-12-09 | End: 2019-12-09 | Stop reason: HOSPADM

## 2019-12-09 RX ORDER — SODIUM CHLORIDE, SODIUM LACTATE, POTASSIUM CHLORIDE, CALCIUM CHLORIDE 600; 310; 30; 20 MG/100ML; MG/100ML; MG/100ML; MG/100ML
125 INJECTION, SOLUTION INTRAVENOUS CONTINUOUS
Status: DISCONTINUED | OUTPATIENT
Start: 2019-12-09 | End: 2019-12-10 | Stop reason: HOSPADM

## 2019-12-09 RX ORDER — LABETALOL HYDROCHLORIDE 5 MG/ML
5 INJECTION, SOLUTION INTRAVENOUS
Status: DISCONTINUED | OUTPATIENT
Start: 2019-12-09 | End: 2019-12-09 | Stop reason: HOSPADM

## 2019-12-09 RX ORDER — PROMETHAZINE HYDROCHLORIDE 25 MG/1
25 SUPPOSITORY RECTAL ONCE AS NEEDED
Status: DISCONTINUED | OUTPATIENT
Start: 2019-12-09 | End: 2019-12-09 | Stop reason: HOSPADM

## 2019-12-09 RX ORDER — FLUMAZENIL 0.1 MG/ML
0.2 INJECTION INTRAVENOUS AS NEEDED
Status: DISCONTINUED | OUTPATIENT
Start: 2019-12-09 | End: 2019-12-09 | Stop reason: HOSPADM

## 2019-12-09 RX ORDER — PROMETHAZINE HYDROCHLORIDE 12.5 MG/1
12.5 TABLET ORAL EVERY 6 HOURS PRN
Status: DISCONTINUED | OUTPATIENT
Start: 2019-12-09 | End: 2019-12-10 | Stop reason: HOSPADM

## 2019-12-09 RX ORDER — PROMETHAZINE HYDROCHLORIDE 25 MG/ML
6.25 INJECTION, SOLUTION INTRAMUSCULAR; INTRAVENOUS
Status: DISCONTINUED | OUTPATIENT
Start: 2019-12-09 | End: 2019-12-10 | Stop reason: HOSPADM

## 2019-12-09 RX ORDER — OXYCODONE AND ACETAMINOPHEN 7.5; 325 MG/1; MG/1
1 TABLET ORAL EVERY 4 HOURS PRN
Status: DISCONTINUED | OUTPATIENT
Start: 2019-12-09 | End: 2019-12-10 | Stop reason: HOSPADM

## 2019-12-09 RX ORDER — NALOXONE HCL 0.4 MG/ML
0.1 VIAL (ML) INJECTION
Status: DISCONTINUED | OUTPATIENT
Start: 2019-12-09 | End: 2019-12-10 | Stop reason: HOSPADM

## 2019-12-09 RX ORDER — EPHEDRINE SULFATE 50 MG/ML
5 INJECTION, SOLUTION INTRAVENOUS ONCE AS NEEDED
Status: DISCONTINUED | OUTPATIENT
Start: 2019-12-09 | End: 2019-12-09 | Stop reason: HOSPADM

## 2019-12-09 RX ORDER — ACETAMINOPHEN 650 MG/1
650 SUPPOSITORY RECTAL ONCE AS NEEDED
Status: DISCONTINUED | OUTPATIENT
Start: 2019-12-09 | End: 2019-12-09 | Stop reason: HOSPADM

## 2019-12-09 RX ORDER — BUPIVACAINE HCL/0.9 % NACL/PF 0.1 %
2 PLASTIC BAG, INJECTION (ML) EPIDURAL EVERY 8 HOURS
Status: COMPLETED | OUTPATIENT
Start: 2019-12-09 | End: 2019-12-10

## 2019-12-09 RX ORDER — BISACODYL 10 MG
10 SUPPOSITORY, RECTAL RECTAL DAILY PRN
Status: DISCONTINUED | OUTPATIENT
Start: 2019-12-09 | End: 2019-12-10 | Stop reason: HOSPADM

## 2019-12-09 RX ORDER — DEXAMETHASONE SODIUM PHOSPHATE 4 MG/ML
INJECTION, SOLUTION INTRA-ARTICULAR; INTRALESIONAL; INTRAMUSCULAR; INTRAVENOUS; SOFT TISSUE AS NEEDED
Status: DISCONTINUED | OUTPATIENT
Start: 2019-12-09 | End: 2019-12-09 | Stop reason: SURG

## 2019-12-09 RX ORDER — ONDANSETRON 2 MG/ML
4 INJECTION INTRAMUSCULAR; INTRAVENOUS EVERY 6 HOURS PRN
Status: DISCONTINUED | OUTPATIENT
Start: 2019-12-09 | End: 2019-12-10 | Stop reason: HOSPADM

## 2019-12-09 RX ORDER — BUPIVACAINE HYDROCHLORIDE AND EPINEPHRINE 5; 5 MG/ML; UG/ML
INJECTION, SOLUTION EPIDURAL; INTRACAUDAL; PERINEURAL AS NEEDED
Status: DISCONTINUED | OUTPATIENT
Start: 2019-12-09 | End: 2019-12-09 | Stop reason: HOSPADM

## 2019-12-09 RX ORDER — NEOSTIGMINE METHYLSULFATE 4 MG/4 ML
SYRINGE (ML) INTRAVENOUS AS NEEDED
Status: DISCONTINUED | OUTPATIENT
Start: 2019-12-09 | End: 2019-12-09 | Stop reason: SURG

## 2019-12-09 RX ORDER — NALOXONE HCL 0.4 MG/ML
0.4 VIAL (ML) INJECTION AS NEEDED
Status: DISCONTINUED | OUTPATIENT
Start: 2019-12-09 | End: 2019-12-09 | Stop reason: HOSPADM

## 2019-12-09 RX ORDER — FENTANYL CITRATE 50 UG/ML
INJECTION, SOLUTION INTRAMUSCULAR; INTRAVENOUS AS NEEDED
Status: DISCONTINUED | OUTPATIENT
Start: 2019-12-09 | End: 2019-12-09 | Stop reason: SURG

## 2019-12-09 RX ORDER — DIPHENHYDRAMINE HYDROCHLORIDE 50 MG/ML
12.5 INJECTION INTRAMUSCULAR; INTRAVENOUS
Status: DISCONTINUED | OUTPATIENT
Start: 2019-12-09 | End: 2019-12-09 | Stop reason: HOSPADM

## 2019-12-09 RX ORDER — SUCCINYLCHOLINE CHLORIDE 20 MG/ML
INJECTION INTRAMUSCULAR; INTRAVENOUS AS NEEDED
Status: DISCONTINUED | OUTPATIENT
Start: 2019-12-09 | End: 2019-12-09 | Stop reason: SURG

## 2019-12-09 RX ORDER — MIDAZOLAM HYDROCHLORIDE 1 MG/ML
INJECTION INTRAMUSCULAR; INTRAVENOUS AS NEEDED
Status: DISCONTINUED | OUTPATIENT
Start: 2019-12-09 | End: 2019-12-09 | Stop reason: SURG

## 2019-12-09 RX ORDER — PROMETHAZINE HYDROCHLORIDE 12.5 MG/1
12.5 SUPPOSITORY RECTAL EVERY 6 HOURS PRN
Status: DISCONTINUED | OUTPATIENT
Start: 2019-12-09 | End: 2019-12-10 | Stop reason: HOSPADM

## 2019-12-09 RX ORDER — ONDANSETRON 2 MG/ML
4 INJECTION INTRAMUSCULAR; INTRAVENOUS ONCE AS NEEDED
Status: COMPLETED | OUTPATIENT
Start: 2019-12-09 | End: 2019-12-09

## 2019-12-09 RX ORDER — LIDOCAINE HYDROCHLORIDE 20 MG/ML
INJECTION, SOLUTION INFILTRATION; PERINEURAL AS NEEDED
Status: DISCONTINUED | OUTPATIENT
Start: 2019-12-09 | End: 2019-12-09 | Stop reason: SURG

## 2019-12-09 RX ORDER — KETOROLAC TROMETHAMINE 30 MG/ML
INJECTION, SOLUTION INTRAMUSCULAR; INTRAVENOUS AS NEEDED
Status: DISCONTINUED | OUTPATIENT
Start: 2019-12-09 | End: 2019-12-09 | Stop reason: SURG

## 2019-12-09 RX ORDER — KETOROLAC TROMETHAMINE 30 MG/ML
30 INJECTION, SOLUTION INTRAMUSCULAR; INTRAVENOUS EVERY 6 HOURS
Status: DISCONTINUED | OUTPATIENT
Start: 2019-12-09 | End: 2019-12-10 | Stop reason: HOSPADM

## 2019-12-09 RX ORDER — PROPOFOL 10 MG/ML
VIAL (ML) INTRAVENOUS AS NEEDED
Status: DISCONTINUED | OUTPATIENT
Start: 2019-12-09 | End: 2019-12-09 | Stop reason: SURG

## 2019-12-09 RX ORDER — BUPIVACAINE HCL/0.9 % NACL/PF 0.1 %
2 PLASTIC BAG, INJECTION (ML) EPIDURAL ONCE
Status: COMPLETED | OUTPATIENT
Start: 2019-12-09 | End: 2019-12-09

## 2019-12-09 RX ORDER — SIMETHICONE 80 MG
80 TABLET,CHEWABLE ORAL 4 TIMES DAILY PRN
Status: DISCONTINUED | OUTPATIENT
Start: 2019-12-09 | End: 2019-12-10 | Stop reason: HOSPADM

## 2019-12-09 RX ORDER — PROMETHAZINE HYDROCHLORIDE 25 MG/1
25 TABLET ORAL ONCE AS NEEDED
Status: DISCONTINUED | OUTPATIENT
Start: 2019-12-09 | End: 2019-12-09 | Stop reason: HOSPADM

## 2019-12-09 RX ADMIN — HYDROMORPHONE HYDROCHLORIDE 0.5 MG: 1 INJECTION, SOLUTION INTRAMUSCULAR; INTRAVENOUS; SUBCUTANEOUS at 17:27

## 2019-12-09 RX ADMIN — SODIUM CHLORIDE, POTASSIUM CHLORIDE, SODIUM LACTATE AND CALCIUM CHLORIDE 125 ML/HR: 600; 310; 30; 20 INJECTION, SOLUTION INTRAVENOUS at 20:49

## 2019-12-09 RX ADMIN — FENTANYL CITRATE 50 MCG: 50 INJECTION, SOLUTION INTRAMUSCULAR; INTRAVENOUS at 13:33

## 2019-12-09 RX ADMIN — ROCURONIUM BROMIDE 20 MG: 10 INJECTION INTRAVENOUS at 14:18

## 2019-12-09 RX ADMIN — SIMETHICONE CHEW TAB 80 MG 80 MG: 80 TABLET ORAL at 21:15

## 2019-12-09 RX ADMIN — POLYETHYLENE GLYCOL 3350 17 G: 17 POWDER, FOR SOLUTION ORAL at 19:45

## 2019-12-09 RX ADMIN — FLUORESCEIN SODIUM 0.25 MG: 100 INJECTION INTRAVENOUS at 15:59

## 2019-12-09 RX ADMIN — SODIUM CHLORIDE, POTASSIUM CHLORIDE, SODIUM LACTATE AND CALCIUM CHLORIDE 125 ML/HR: 600; 310; 30; 20 INJECTION, SOLUTION INTRAVENOUS at 09:49

## 2019-12-09 RX ADMIN — ROCURONIUM BROMIDE 35 MG: 10 INJECTION INTRAVENOUS at 13:06

## 2019-12-09 RX ADMIN — ONDANSETRON 4 MG: 2 INJECTION INTRAMUSCULAR; INTRAVENOUS at 20:49

## 2019-12-09 RX ADMIN — SODIUM CHLORIDE 2 G: 9 INJECTION, SOLUTION INTRAVENOUS at 21:19

## 2019-12-09 RX ADMIN — MEPERIDINE HYDROCHLORIDE 12.5 MG: 25 INJECTION, SOLUTION INTRAMUSCULAR; INTRAVENOUS; SUBCUTANEOUS at 16:51

## 2019-12-09 RX ADMIN — ROCURONIUM BROMIDE 5 MG: 10 INJECTION INTRAVENOUS at 12:57

## 2019-12-09 RX ADMIN — ONDANSETRON 4 MG: 2 INJECTION INTRAMUSCULAR; INTRAVENOUS at 17:11

## 2019-12-09 RX ADMIN — PROMETHAZINE HYDROCHLORIDE 6.25 MG: 25 INJECTION INTRAMUSCULAR; INTRAVENOUS at 22:12

## 2019-12-09 RX ADMIN — FENTANYL CITRATE 50 MCG: 50 INJECTION, SOLUTION INTRAMUSCULAR; INTRAVENOUS at 14:17

## 2019-12-09 RX ADMIN — FENTANYL CITRATE 50 MCG: 50 INJECTION, SOLUTION INTRAMUSCULAR; INTRAVENOUS at 14:56

## 2019-12-09 RX ADMIN — SUCCINYLCHOLINE CHLORIDE 160 MG: 20 INJECTION, SOLUTION INTRAMUSCULAR; INTRAVENOUS at 12:57

## 2019-12-09 RX ADMIN — MEPERIDINE HYDROCHLORIDE 12.5 MG: 25 INJECTION, SOLUTION INTRAMUSCULAR; INTRAVENOUS; SUBCUTANEOUS at 16:59

## 2019-12-09 RX ADMIN — MIDAZOLAM HYDROCHLORIDE 2 MG: 2 INJECTION, SOLUTION INTRAMUSCULAR; INTRAVENOUS at 12:47

## 2019-12-09 RX ADMIN — DEXAMETHASONE SODIUM PHOSPHATE 4 MG: 4 INJECTION, SOLUTION INTRAMUSCULAR; INTRAVENOUS at 15:17

## 2019-12-09 RX ADMIN — Medication 2 G: at 13:06

## 2019-12-09 RX ADMIN — SODIUM CHLORIDE, SODIUM GLUCONATE, SODIUM ACETATE, POTASSIUM CHLORIDE, AND MAGNESIUM CHLORIDE: 526; 502; 368; 37; 30 INJECTION, SOLUTION INTRAVENOUS at 13:35

## 2019-12-09 RX ADMIN — HYDROMORPHONE HYDROCHLORIDE 0.5 MG: 1 INJECTION, SOLUTION INTRAMUSCULAR; INTRAVENOUS; SUBCUTANEOUS at 17:07

## 2019-12-09 RX ADMIN — FENTANYL CITRATE 50 MCG: 50 INJECTION, SOLUTION INTRAMUSCULAR; INTRAVENOUS at 15:51

## 2019-12-09 RX ADMIN — FENTANYL CITRATE 50 MCG: 50 INJECTION, SOLUTION INTRAMUSCULAR; INTRAVENOUS at 13:54

## 2019-12-09 RX ADMIN — PROPOFOL 150 MG: 10 INJECTION, EMULSION INTRAVENOUS at 12:57

## 2019-12-09 RX ADMIN — MIDAZOLAM HYDROCHLORIDE 2 MG: 2 INJECTION, SOLUTION INTRAMUSCULAR; INTRAVENOUS at 12:57

## 2019-12-09 RX ADMIN — OXYCODONE HYDROCHLORIDE AND ACETAMINOPHEN 1 TABLET: 7.5; 325 TABLET ORAL at 19:09

## 2019-12-09 RX ADMIN — SODIUM CHLORIDE, SODIUM GLUCONATE, SODIUM ACETATE, POTASSIUM CHLORIDE, AND MAGNESIUM CHLORIDE: 526; 502; 368; 37; 30 INJECTION, SOLUTION INTRAVENOUS at 16:04

## 2019-12-09 RX ADMIN — GLYCOPYRROLATE 0.4 MG: 0.2 INJECTION, SOLUTION INTRAMUSCULAR; INTRAVITREAL at 16:34

## 2019-12-09 RX ADMIN — FENTANYL CITRATE 100 MCG: 50 INJECTION, SOLUTION INTRAMUSCULAR; INTRAVENOUS at 13:13

## 2019-12-09 RX ADMIN — FENTANYL CITRATE 50 MCG: 50 INJECTION, SOLUTION INTRAMUSCULAR; INTRAVENOUS at 14:40

## 2019-12-09 RX ADMIN — KETOROLAC TROMETHAMINE 30 MG: 30 INJECTION, SOLUTION INTRAMUSCULAR at 21:15

## 2019-12-09 RX ADMIN — ROCURONIUM BROMIDE 20 MG: 10 INJECTION INTRAVENOUS at 14:47

## 2019-12-09 RX ADMIN — LIDOCAINE HYDROCHLORIDE 80 MG: 20 INJECTION, SOLUTION INFILTRATION; PERINEURAL at 12:57

## 2019-12-09 RX ADMIN — KETOROLAC TROMETHAMINE 30 MG: 30 INJECTION, SOLUTION INTRAMUSCULAR at 16:21

## 2019-12-09 RX ADMIN — Medication 3 MG: at 16:34

## 2019-12-09 RX ADMIN — PROPOFOL 50 MG: 10 INJECTION, EMULSION INTRAVENOUS at 12:59

## 2019-12-09 RX ADMIN — FENTANYL CITRATE 50 MCG: 50 INJECTION, SOLUTION INTRAMUSCULAR; INTRAVENOUS at 16:17

## 2019-12-09 RX ADMIN — FENTANYL CITRATE 50 MCG: 50 INJECTION, SOLUTION INTRAMUSCULAR; INTRAVENOUS at 12:57

## 2019-12-09 NOTE — OP NOTE
OPERATIVE NOTE  Narda Sheehan  1985 12/9/2019    PREOP DIAGNOSES:  Abnormal pelvic ultrasound [R93.89]    POSTOP DIAGNOSES:  Post-Op Diagnosis Codes:     * Abnormal pelvic ultrasound [R93.89]    Procedure(s):  TOTAL LAPAROSCOPIC HYSTERECTOMY  LEFT OOPHORECTOMY  CYSTOSCOPY    SURGEON: Javier Turner DO, FACOG    ASSISTANT: Nahomy Romero CSA      STAFF:   Circulator: Esha Henderson RN; Aicha Stiles RN  Scrub Person: Sussy Booth  Assistant: Nahomy Romero CSA    ANESTHESIA: Choice    ANESTHESIA STAFF:  Anesthesiologist: Mayo Terry MD  CRNA: Earl Pickett CRNA  Student Nurse Anesthetist: Christi aTylor SRNA    ESTIMATED BLOOD LOSS: 500 ml     SPECIMEN:   ID Type Source Tests Collected by Time   A (Not marked as sent) :  Tissue Uterus, Cervix, L Fallopian Tube, L Ovary TISSUE PATHOLOGY EXAM Javier Turner DO 12/9/2019 1618       FINDINGS: Enlarged 10-week size uterus with normal-appearing right and left ovaries.  Normal-appearing fallopian tubes minimal amount of adhesive disease near the bladder.  Effluxing from UOs bilaterally    COMPLICATIONS: None    DESCRIPTION OF OPERATION:     After obtaining informed consent, the patient was taken to the operating room where General anesthesia was found to be adequate.  She was then prepped and draped in the normal sterile fashion in the low lithotomy position. A final time out was performed. Preoperative antibiotics given.    A bivaled speculum was placed into the vagina, and the cervix visualized with findings as noted above. A V-Care uterine manipulator was placed without difficulty. A cuevas catheter was then place.         Attention was then turned to the abdomen. A 5mm incision was then made in the umbilicus and a 5mm trocar was placed under visualization. The abdomen was then insuflated and pelvic survery performed. A second 5mm trocar was place at the level of the umbilicus on the patient’s left follow by the same thing on  the patient’s right. The patient was placed into trendeleberg position.    Attention was then turned to the right fallopian tube. The right mesosalpinx was  cauterized and transected with Enseal to the level of the cornua. The right fallopian tube was then ambutated.     The right round ligament was cauterized and transected with the Enseal. A bladder flap on the right was then carried to the midline. The left round ligament was cauterized with the Enseal and transected. The remainder of the bladder flap was created on the left and connected at the mid line. The left IP ligament was then cauterized with bipolar energy and transected. The broad ligament on the right was then cauterized with bipolar and transected. The right uterine artery was identified and cauterized with bipolar energy and transected.     The bladder was then dissected off of the uterus. The right uteroovarian ligament was then cauterized and transected with Enseal. The broad ligament on the left was then cauterized with bipolar energy and transected. The left uterine artery was identified, cauterized with bipolar energy and transected.    The bilateral cardinal ligaments were then cauterized and transected with the Enseal device to the level of the V-Care surgical cup. The entire cup was able to be felt laparoscopically. A colpotomy was performed 360 degrees around the entire cervix and the cervix and uterus were amputated. The uterus was then pulled into the vagina to maintain pneumoperitoneum. The umbilical port site was then extended to 11mm and the 5mm trocar was removed. An 11mm tocar was then placed and the vagina cuff was closed with 2-0 V-lock suture in a running fashion using an endostitch device. Good hemostastic was noted.  FloSeal was placed over the hysterotomy site.  Blood was noted near the liver in the right hepatic gutter and was suction irrigated    Cystoscopy was performed with normal appearing bladder and efflux of urine noted  from both UO’s. No sutures seen in the bladder.  A final vaginal sweep was performed.    Attention was returned to the abdomen and hemostasis was noted over all pedicals. The abdomen was desuflated and the trocar sites were closed with 4-0 monocryl and dermabond.    The patient tolerated the procedure well.  Sponge, lap, and needle counts were correct times two.  The patient was taken to the recovery room in stable condition.        This document has been electronically signed by Javier Turner DO on December 9, 2019 4:37 PM

## 2019-12-09 NOTE — ANESTHESIA POSTPROCEDURE EVALUATION
Patient: Narda Sheehan    Procedure Summary     Date:  12/09/19 Room / Location:  [facility] OR [address] [place] [address] OR    Anesthesia Start:  1250 Anesthesia Stop:  1645    Procedures:       TOTAL LAPAROSCOPIC HYSTERECTOMY (N/A Abdomen)      LEFT OOPHORECTOMY (Bilateral Abdomen)      CYSTOSCOPY (Left Abdomen) Diagnosis:       Abnormal pelvic ultrasound      (Abnormal pelvic ultrasound [R93.89])    Surgeon:  Javier Turner DO Provider:  Mayo Terry MD    Anesthesia Type:  general ASA Status:  3          Anesthesia Type: general    Vitals  No vitals data found for the desired time range.          Post Anesthesia Care and Evaluation    Patient location during evaluation: PACU  Patient participation: complete - patient participated  Level of consciousness: awake and alert  Pain score: 0  Pain management: adequate  Airway patency: patent  Anesthetic complications: No anesthetic complications    Cardiovascular status: acceptable and hemodynamically stable  Respiratory status: acceptable, face mask and spontaneous ventilation  Hydration status: acceptable

## 2019-12-09 NOTE — ANESTHESIA PREPROCEDURE EVALUATION
Anesthesia Evaluation     no history of anesthetic complications:  NPO Solid Status: > 8 hours  NPO Liquid Status: > 8 hours           Airway   Mallampati: I  TM distance: >3 FB  Neck ROM: full  No difficulty expected  Dental - normal exam     Pulmonary     breath sounds clear to auscultation  (-) COPD, asthma, sleep apnea, not a smoker    ROS comment: SNORES  Cardiovascular   Exercise tolerance: good (4-7 METS)    ECG reviewed  Rhythm: regular  Rate: normal    (+) hypertension (OFF MEDS), valvular problems/murmurs MVP, murmur,   (-) dysrhythmias, angina, cardiac stents, DVT, hyperlipidemia    ROS comment: Normal sinus rhythm  Normal ECG  When compared with ECG of 09-APR-2019 13:40  No significant change    Neuro/Psych- negative ROS  (-) seizures, TIA, CVA, headaches, psychiatric history  GI/Hepatic/Renal/Endo    (-) hepatitis, liver disease, no renal disease, diabetes, no thyroid disorder    Musculoskeletal     (-) arthralgias  Abdominal   (+) obese,    Substance History   (-) alcohol use, drug use     OB/GYN negative ob/gyn ROS   (-)  Pregnant    Comment: ABNORMAL PELVIC ULTRASOUND      Other        (-) arthritis                  Anesthesia Plan    ASA 3     general       Anesthetic plan, all risks, benefits, and alternatives have been provided, discussed and informed consent has been obtained with: patient and spouse/significant other.

## 2019-12-09 NOTE — ANESTHESIA PROCEDURE NOTES
Airway  Urgency: elective    Date/Time: 12/9/2019 1:00 PM  End Time:12/9/2019 1:00 PM  Airway not difficult    General Information and Staff    Patient location during procedure: OR  CRNA: Earl Pickett CRNA    Indications and Patient Condition  Indications for airway management: airway protection    Preoxygenated: yes  MILS maintained throughout  Mask difficulty assessment: 0 - not attempted    Final Airway Details  Final airway type: endotracheal airway      Successful airway: ETT  Cuffed: yes   Successful intubation technique: direct laryngoscopy  Facilitating devices/methods: intubating stylet  Endotracheal tube insertion site: oral  Blade: Eva  Blade size: 3  ETT size (mm): 7.5  Cormack-Lehane Classification: grade I - full view of glottis  Placement verified by: chest auscultation   Cuff volume (mL): 8  Measured from: teeth  ETT/EBT  to teeth (cm): 20  Number of attempts at approach: 1  Assessment: lips, teeth, and gum same as pre-op and atraumatic intubation    Additional Comments  Intubation performed by DARRIUS HALL

## 2019-12-10 VITALS
WEIGHT: 253.53 LBS | HEIGHT: 65 IN | DIASTOLIC BLOOD PRESSURE: 66 MMHG | HEART RATE: 69 BPM | OXYGEN SATURATION: 97 % | BODY MASS INDEX: 42.24 KG/M2 | TEMPERATURE: 98 F | RESPIRATION RATE: 18 BRPM | SYSTOLIC BLOOD PRESSURE: 115 MMHG

## 2019-12-10 LAB
DEPRECATED RDW RBC AUTO: 37.9 FL (ref 37–54)
ERYTHROCYTE [DISTWIDTH] IN BLOOD BY AUTOMATED COUNT: 12.6 % (ref 12.3–15.4)
HCT VFR BLD AUTO: 35.8 % (ref 34–46.6)
HGB BLD-MCNC: 12.1 G/DL (ref 12–15.9)
MCH RBC QN AUTO: 28.3 PG (ref 26.6–33)
MCHC RBC AUTO-ENTMCNC: 33.8 G/DL (ref 31.5–35.7)
MCV RBC AUTO: 83.8 FL (ref 79–97)
PLATELET # BLD AUTO: 301 10*3/MM3 (ref 140–450)
PMV BLD AUTO: 9.5 FL (ref 6–12)
RBC # BLD AUTO: 4.27 10*6/MM3 (ref 3.77–5.28)
WBC NRBC COR # BLD: 16.37 10*3/MM3 (ref 3.4–10.8)

## 2019-12-10 PROCEDURE — 99024 POSTOP FOLLOW-UP VISIT: CPT | Performed by: OBSTETRICS & GYNECOLOGY

## 2019-12-10 PROCEDURE — 94799 UNLISTED PULMONARY SVC/PX: CPT

## 2019-12-10 PROCEDURE — 94760 N-INVAS EAR/PLS OXIMETRY 1: CPT

## 2019-12-10 PROCEDURE — 85027 COMPLETE CBC AUTOMATED: CPT | Performed by: OBSTETRICS & GYNECOLOGY

## 2019-12-10 PROCEDURE — 25010000002 CEFAZOLIN PER 500 MG: Performed by: OBSTETRICS & GYNECOLOGY

## 2019-12-10 PROCEDURE — 25010000002 KETOROLAC TROMETHAMINE PER 15 MG: Performed by: OBSTETRICS & GYNECOLOGY

## 2019-12-10 RX ORDER — OXYCODONE AND ACETAMINOPHEN 7.5; 325 MG/1; MG/1
1 TABLET ORAL EVERY 4 HOURS PRN
Qty: 18 TABLET | Refills: 0 | Status: SHIPPED | OUTPATIENT
Start: 2019-12-10 | End: 2019-12-18

## 2019-12-10 RX ORDER — IBUPROFEN 800 MG/1
800 TABLET ORAL EVERY 8 HOURS PRN
Qty: 60 TABLET | Refills: 2 | Status: SHIPPED | OUTPATIENT
Start: 2019-12-10 | End: 2021-03-02

## 2019-12-10 RX ADMIN — SODIUM CHLORIDE 2 G: 9 INJECTION, SOLUTION INTRAVENOUS at 05:33

## 2019-12-10 RX ADMIN — KETOROLAC TROMETHAMINE 30 MG: 30 INJECTION, SOLUTION INTRAMUSCULAR at 03:34

## 2019-12-10 RX ADMIN — OXYCODONE HYDROCHLORIDE AND ACETAMINOPHEN 1 TABLET: 7.5; 325 TABLET ORAL at 01:37

## 2019-12-10 RX ADMIN — OXYCODONE HYDROCHLORIDE AND ACETAMINOPHEN 1 TABLET: 7.5; 325 TABLET ORAL at 06:59

## 2019-12-10 RX ADMIN — SIMETHICONE CHEW TAB 80 MG 80 MG: 80 TABLET ORAL at 03:34

## 2019-12-10 RX ADMIN — SIMETHICONE CHEW TAB 80 MG 80 MG: 80 TABLET ORAL at 09:55

## 2019-12-10 RX ADMIN — OXYCODONE HYDROCHLORIDE AND ACETAMINOPHEN 1 TABLET: 7.5; 325 TABLET ORAL at 11:38

## 2019-12-10 RX ADMIN — KETOROLAC TROMETHAMINE 30 MG: 30 INJECTION, SOLUTION INTRAMUSCULAR at 09:45

## 2019-12-10 NOTE — PLAN OF CARE
Problem: Patient Care Overview  Goal: Plan of Care Review  Outcome: Ongoing (interventions implemented as appropriate)  Note:   Pt arrived on MBU at 1830, voices min amt of pain VAS=3-4, smiling and talking, family with her at beside, transferred well from stretcher to bed then waked to bathroom to void at 1845, will f/u with Echo

## 2019-12-10 NOTE — DISCHARGE SUMMARY
[facility]  Narda Sheehan  : 1985  MRN: [id number]  CSN: [id number]    Discharge Summary      Date of Admission: 2019   Date of Discharge: 12/10/2019   Discharge Diagnosis: 1.  Fibroid uterus with likely adenomyosis, abnormal uterine bleeding.   Procedures Performed: Procedure(s):  TOTAL LAPAROSCOPIC HYSTERECTOMY  LEFT OOPHORECTOMY  CYSTOSCOPY      Brief History: Patient is a 34 y.o.who presented for scheduled hysterectomy secondary to fibroid uterus..   Hospital Course: Her hospital course has been uneventful.  Today, on postoperative day # 1, she is tolerating a regular diet, ambulating without assistance, and desires to go home.  Cochran catheter was removed this morning, and she has urinated without difficulty. She has had no fever, and her pain is controlled.  She has had no nausea or vomiting.   Pending Studies: Tissue Pathology   Condition at Discharge: Stable   Discharge Diet: Diet Instructions     Diet: Regular      Discharge Diet:  Regular         Discharge Activity: Activity Instructions     Activity as Tolerated      Other Activity Restrictions      No driving for 1 week or while on narcotic pain medications. Riding is car is fine.  No lifting more than 20 pounds for 4 weeks.  No soaking in bathtub for 4 weeks, showers are fine.  Nothing in vagina including tampons/sexual intercourse for 8 weeks.  May return to work/school in 4 weeks.    Type of Restriction:  Other    Explain Other Restrictions:  see below         Discharge Medications:    Your medication list      START taking these medications      Instructions Last Dose Given Next Dose Due   ibuprofen 800 MG tablet  Commonly known as:  ADVIL,MOTRIN      Take 1 tablet by mouth Every 8 (Eight) Hours As Needed for Mild Pain .       oxyCODONE-acetaminophen 7.5-325 MG per tablet  Commonly known as:  PERCOCET      Take 1 tablet by mouth Every 4 (Four) Hours As Needed for Moderate Pain  for up to 9 days.       polyethylene glycol pack  packet  Commonly known as:  MIRALAX      Take 17 g by mouth 2 (Two) Times a Day.             Where to Get Your Medications      These medications were sent to [facility] - [place] [address]    Hours:  Monday through Friday 7:00am to 5:00pm Phone:  [phone] ·   ibuprofen 800 MG tablet  · oxyCODONE-acetaminophen 7.5-325 MG per tablet  · polyethylene glycol pack packet        Discharge Disposition: home   Follow-up: Future Appointments   Date Time Provider Department Center   12/18/2019  8:45 AM Javier Turner DO [facility] None   12/2/2020 10:00 AM Raul Gloria MD [facility] None            This note has been electronically signed.    Javier Turner DO  December 10, 2019  7:21 AM

## 2019-12-10 NOTE — PROGRESS NOTES
[facility]  Narda Sheehan  : 1985  MRN: [id number]  CSN: [id number]    Post-operative Day #1  Subjective   Status post total laparoscopic hysterectomy with left salpingo-oophorectomy and right salpingectomy on postoperative day 1, patient states that she is feeling well and pain is well controlled.  She did have a small bowel movement.  Has urinated without difficulty and has been up and walking.  Tolerating pudding and crackers at this time.  No complaints currently     Objective     Min/max vitals past 24 hours:   Temp  Min: 97 °F (36.1 °C)  Max: 99.1 °F (37.3 °C)  BP  Min: 101/62  Max: 141/78  Pulse  Min: 73  Max: 106  Pulse  Min: 73  Max: 106        General: well developed; well nourished  no acute distress   Abdomen:  Soft, appropriately tender.  Bowel sounds are present although slightly decreased.  Bandages in place over trocar site incisions.   Pelvic: Not performed   Ext: Calves NT     Lab Results   Component Value Date    WBC 16.37 (H) 12/10/2019    HGB 12.1 12/10/2019    HCT 35.8 12/10/2019    MCV 83.8 12/10/2019     12/10/2019    RH Positive 2019        Assessment   1. S/P total laparoscopic hysterectomy on postoperative day 1, doing well and meeting appropriate milestones.  Appropriate change in H&H.  Vital signs have been stable.     Plan   1. Continue routine post-operative care   2. Encourage ambulation  3. Continue to monitor until this afternoon if stable will likely discharge to home today  4. Encourage incentive spirometer  5. Continue current pain control regimen  6. Follow-up in 1 week      This document has been electronically signed by Javier Turner DO on December 10, 2019 7:14 AM

## 2019-12-10 NOTE — PLAN OF CARE
Problem: Patient Care Overview  Goal: Individualization and Mutuality  Outcome: Ongoing (interventions implemented as appropriate)     Problem: Patient Care Overview  Goal: Discharge Needs Assessment  Outcome: Ongoing (interventions implemented as appropriate)  Flowsheets  Taken 12/9/2019 1845 by Catherine Mayo RN  Equipment Needed After Discharge: none  Anticipated Changes Related to Illness: none  Concerns to be Addressed: no discharge needs identified  Readmission Within the Last 30 Days: no previous admission in last 30 days  Taken 12/9/2019 2144 by Toya Crooks, RN  Equipment Currently Used at Home: none  Taken 12/9/2019 2146 by Toya Crooks, RN  Transportation Anticipated: car, drives self;family or friend will provide  Patient/Family Anticipated Services at Transition: none  Patient/Family Anticipates Transition to: home     Problem: Patient Care Overview  Goal: Plan of Care Review  Outcome: Ongoing (interventions implemented as appropriate)  Flowsheets  Taken 12/10/2019 0452  Progress: improving  Outcome Summary: Ambulating to bathroom and in the chavez, receiving ancef and toradol scheduled, percocet prn.  Taken 12/9/2019 1940  Plan of Care Reviewed With: patient

## 2019-12-11 LAB
LAB AP CASE REPORT: NORMAL
PATH REPORT.FINAL DX SPEC: NORMAL
PATH REPORT.GROSS SPEC: NORMAL

## 2019-12-18 ENCOUNTER — APPOINTMENT (OUTPATIENT)
Dept: LAB | Facility: HOSPITAL | Age: 34
End: 2019-12-18

## 2019-12-18 ENCOUNTER — OFFICE VISIT (OUTPATIENT)
Dept: OBSTETRICS AND GYNECOLOGY | Facility: CLINIC | Age: 34
End: 2019-12-18

## 2019-12-18 VITALS
WEIGHT: 245.4 LBS | HEIGHT: 65 IN | BODY MASS INDEX: 40.89 KG/M2 | DIASTOLIC BLOOD PRESSURE: 94 MMHG | SYSTOLIC BLOOD PRESSURE: 138 MMHG

## 2019-12-18 DIAGNOSIS — R30.9 PAINFUL URINATION: Primary | ICD-10-CM

## 2019-12-18 DIAGNOSIS — T81.41XA INFECTION OF SUPERFICIAL INCISIONAL SURGICAL SITE AFTER PROCEDURE, INITIAL ENCOUNTER: ICD-10-CM

## 2019-12-18 DIAGNOSIS — Z09 POSTOPERATIVE FOLLOW-UP: ICD-10-CM

## 2019-12-18 LAB
BACTERIA UR QL AUTO: ABNORMAL /HPF
BILIRUB UR QL STRIP: NEGATIVE
CLARITY UR: ABNORMAL
COLOR UR: YELLOW
GLUCOSE UR STRIP-MCNC: NEGATIVE MG/DL
HGB UR QL STRIP.AUTO: ABNORMAL
HYALINE CASTS UR QL AUTO: ABNORMAL /LPF
KETONES UR QL STRIP: NEGATIVE
LEUKOCYTE ESTERASE UR QL STRIP.AUTO: ABNORMAL
NITRITE UR QL STRIP: NEGATIVE
PH UR STRIP.AUTO: 5.5 [PH] (ref 5–8)
PROT UR QL STRIP: ABNORMAL
RBC # UR: ABNORMAL /HPF
REF LAB TEST METHOD: ABNORMAL
SP GR UR STRIP: 1.01 (ref 1–1.03)
SQUAMOUS #/AREA URNS HPF: ABNORMAL /HPF
UROBILINOGEN UR QL STRIP: ABNORMAL
WBC UR QL AUTO: ABNORMAL /HPF

## 2019-12-18 PROCEDURE — 81001 URINALYSIS AUTO W/SCOPE: CPT | Performed by: OBSTETRICS & GYNECOLOGY

## 2019-12-18 PROCEDURE — 99024 POSTOP FOLLOW-UP VISIT: CPT | Performed by: OBSTETRICS & GYNECOLOGY

## 2019-12-18 RX ORDER — SULFAMETHOXAZOLE AND TRIMETHOPRIM 800; 160 MG/1; MG/1
1 TABLET ORAL 2 TIMES DAILY
Qty: 14 TABLET | Refills: 0 | Status: SHIPPED | OUTPATIENT
Start: 2019-12-18 | End: 2020-01-22

## 2019-12-18 NOTE — PROGRESS NOTES
Patient presents for postop.  She is status post total laparoscopic hysterectomy on postoperative day #8.  Pain is well controlled, tolerating a regular diet.  She is urinating without difficulty having normal bowel movements, ambulating without difficulty.  Patient is having some pain and burning with urination and some urinary leakage.  Denies any fevers.    Vital signs reviewed and stable  Awake and oriented x3  No acute distress  Abdomen soft, nontender.  Right and left lower quadrant trocar site incisions are healing well.  Umbilical trocar site with some erythema and drainage concerning for mild infection,    Patient overall doing well and recovering appropriately at this time.  Concern for bladder infection and trocar site infection at the umbilicus.  Plan to start patient on Bactrim DS for 7 days, plan to have patient return in 5 weeks return sooner for fevers or worsening pain.

## 2019-12-20 ENCOUNTER — TELEPHONE (OUTPATIENT)
Dept: OBSTETRICS AND GYNECOLOGY | Facility: CLINIC | Age: 34
End: 2019-12-20

## 2019-12-20 DIAGNOSIS — Z09 POSTOPERATIVE FOLLOW-UP: Primary | ICD-10-CM

## 2019-12-20 RX ORDER — OXYCODONE HYDROCHLORIDE AND ACETAMINOPHEN 5; 325 MG/1; MG/1
1 TABLET ORAL EVERY 4 HOURS PRN
Qty: 15 TABLET | Refills: 0 | Status: SHIPPED | OUTPATIENT
Start: 2019-12-20 | End: 2020-01-22

## 2019-12-20 NOTE — TELEPHONE ENCOUNTER
PATIENT IS NEEDING A PRESCRIPTION REFILL FOR PERCOCET AND IS INQUIRING ABOUT URINE TEST RESULTS. PLEASE CALL  [phone]

## 2019-12-23 ENCOUNTER — OFFICE VISIT (OUTPATIENT)
Dept: OBSTETRICS AND GYNECOLOGY | Facility: CLINIC | Age: 34
End: 2019-12-23

## 2019-12-23 VITALS
HEIGHT: 65 IN | WEIGHT: 243.6 LBS | DIASTOLIC BLOOD PRESSURE: 80 MMHG | BODY MASS INDEX: 40.59 KG/M2 | SYSTOLIC BLOOD PRESSURE: 132 MMHG

## 2019-12-23 DIAGNOSIS — T81.41XD INFECTION OF SUPERFICIAL INCISIONAL SURGICAL SITE AFTER PROCEDURE, SUBSEQUENT ENCOUNTER: Primary | ICD-10-CM

## 2019-12-23 PROCEDURE — 99024 POSTOP FOLLOW-UP VISIT: CPT | Performed by: OBSTETRICS & GYNECOLOGY

## 2019-12-23 RX ORDER — NITROFURANTOIN 25; 75 MG/1; MG/1
100 CAPSULE ORAL 2 TIMES DAILY
Qty: 14 CAPSULE | Refills: 0 | Status: SHIPPED | OUTPATIENT
Start: 2019-12-23 | End: 2019-12-30

## 2019-12-24 NOTE — PROGRESS NOTES
Chief complaint: Incisional drainage    Patient states that she has had drainage from umbilical incision. Mild pain at this time, no other complaints currently    VSS  A&Ox3  NAD  Mild erythema at umbilical incision, incision was opened with no drainage noted.     Patient with mild redness and drainage from umbilical incision site. Likely reaction to suture, no clear evidence of infection. Still having urinary symptoms, will treat with macrobid.

## 2020-01-22 ENCOUNTER — OFFICE VISIT (OUTPATIENT)
Dept: OBSTETRICS AND GYNECOLOGY | Facility: CLINIC | Age: 35
End: 2020-01-22

## 2020-01-22 ENCOUNTER — LAB (OUTPATIENT)
Dept: LAB | Facility: HOSPITAL | Age: 35
End: 2020-01-22

## 2020-01-22 VITALS
HEIGHT: 65 IN | BODY MASS INDEX: 41.48 KG/M2 | SYSTOLIC BLOOD PRESSURE: 122 MMHG | DIASTOLIC BLOOD PRESSURE: 81 MMHG | WEIGHT: 249 LBS

## 2020-01-22 DIAGNOSIS — Z09 POSTOPERATIVE FOLLOW-UP: ICD-10-CM

## 2020-01-22 DIAGNOSIS — Z09 POSTOPERATIVE FOLLOW-UP: Primary | ICD-10-CM

## 2020-01-22 DIAGNOSIS — R39.89 SENSATION OF PRESSURE IN BLADDER AREA: ICD-10-CM

## 2020-01-22 LAB
BACTERIA UR QL AUTO: NORMAL /HPF
BILIRUB UR QL STRIP: NEGATIVE
CLARITY UR: CLEAR
COLOR UR: YELLOW
GLUCOSE UR STRIP-MCNC: NEGATIVE MG/DL
HGB UR QL STRIP.AUTO: NEGATIVE
HYALINE CASTS UR QL AUTO: NORMAL /LPF
KETONES UR QL STRIP: NEGATIVE
LEUKOCYTE ESTERASE UR QL STRIP.AUTO: NEGATIVE
NITRITE UR QL STRIP: NEGATIVE
PH UR STRIP.AUTO: 6.5 [PH] (ref 5–8)
PROT UR QL STRIP: NEGATIVE
RBC # UR: NORMAL /HPF
REF LAB TEST METHOD: NORMAL
SP GR UR STRIP: 1.01 (ref 1–1.03)
SQUAMOUS #/AREA URNS HPF: NORMAL /HPF
UROBILINOGEN UR QL STRIP: NORMAL
WBC UR QL AUTO: NORMAL /HPF

## 2020-01-22 PROCEDURE — 81001 URINALYSIS AUTO W/SCOPE: CPT

## 2020-01-22 PROCEDURE — 99024 POSTOP FOLLOW-UP VISIT: CPT | Performed by: OBSTETRICS & GYNECOLOGY

## 2020-01-22 NOTE — PROGRESS NOTES
Complaint: Postoperative visit    Patient presents for 6-week post hysterectomy visit.  Doing well.  Does have some bladder pressure but no pain or burning with urination.  She is ambulating without difficulty urinating without difficulty, tolerating a regular diet.  Did have one episode of vaginal bleeding several weeks ago and none since.  No fevers or chills.    Vital signs reviewed  Awake and oriented x3  No acute distress  Abdomen soft with well-healed trocar site incisions  Vaginal cuff normal in appearance palpated intact.  No pain    Patient doing well fully recovered at this time.  May return to normal activities as tolerated.  Patient to return to see me as needed.  We will get UA to verify there is no infection causing the bladder pressure follow-up will be based on results.

## 2020-12-25 ENCOUNTER — HOSPITAL ENCOUNTER (EMERGENCY)
Facility: HOSPITAL | Age: 35
Discharge: HOME | End: 2020-12-26
Attending: EMERGENCY MEDICINE | Admitting: EMERGENCY MEDICINE

## 2020-12-25 DIAGNOSIS — U07.1 COVID-19 VIRUS INFECTION: Primary | ICD-10-CM

## 2020-12-25 DIAGNOSIS — J18.9 PNEUMONIA OF BOTH LOWER LOBES DUE TO INFECTIOUS ORGANISM: ICD-10-CM

## 2020-12-25 PROCEDURE — 87636 SARSCOV2 & INF A&B AMP PRB: CPT | Performed by: EMERGENCY MEDICINE

## 2020-12-25 PROCEDURE — 96374 THER/PROPH/DIAG INJ IV PUSH: CPT

## 2020-12-25 PROCEDURE — C9803 HOPD COVID-19 SPEC COLLECT: HCPCS

## 2020-12-25 PROCEDURE — 99284 EMERGENCY DEPT VISIT MOD MDM: CPT

## 2020-12-26 ENCOUNTER — APPOINTMENT (OUTPATIENT)
Dept: CT IMAGING | Facility: HOSPITAL | Age: 35
End: 2020-12-26

## 2020-12-26 VITALS
TEMPERATURE: 98.6 F | BODY MASS INDEX: 40.82 KG/M2 | DIASTOLIC BLOOD PRESSURE: 55 MMHG | HEIGHT: 65 IN | HEART RATE: 69 BPM | SYSTOLIC BLOOD PRESSURE: 114 MMHG | WEIGHT: 245 LBS | OXYGEN SATURATION: 98 % | RESPIRATION RATE: 14 BRPM

## 2020-12-26 LAB
ALBUMIN SERPL-MCNC: 3.9 G/DL (ref 3.5–5.2)
ALBUMIN/GLOB SERPL: 1.3 G/DL
ALP SERPL-CCNC: 90 U/L (ref 39–117)
ALT SERPL W P-5'-P-CCNC: 12 U/L (ref 1–33)
ANION GAP SERPL CALCULATED.3IONS-SCNC: 10 MMOL/L (ref 5–15)
AST SERPL-CCNC: 17 U/L (ref 1–32)
BASOPHILS # BLD AUTO: 0.02 10*3/MM3 (ref 0–0.2)
BASOPHILS NFR BLD AUTO: 0.4 % (ref 0–1.5)
BILIRUB SERPL-MCNC: 0.2 MG/DL (ref 0–1.2)
BILIRUB UR QL STRIP: NEGATIVE
BUN SERPL-MCNC: 8 MG/DL (ref 6–20)
BUN/CREAT SERPL: 9.3 (ref 7–25)
CALCIUM SPEC-SCNC: 8.5 MG/DL (ref 8.6–10.5)
CHLORIDE SERPL-SCNC: 104 MMOL/L (ref 98–107)
CLARITY UR: CLEAR
CO2 SERPL-SCNC: 24 MMOL/L (ref 22–29)
COLOR UR: YELLOW
CREAT SERPL-MCNC: 0.86 MG/DL (ref 0.57–1)
DEPRECATED RDW RBC AUTO: 38.5 FL (ref 37–54)
EOSINOPHIL # BLD AUTO: 0.03 10*3/MM3 (ref 0–0.4)
EOSINOPHIL NFR BLD AUTO: 0.6 % (ref 0.3–6.2)
ERYTHROCYTE [DISTWIDTH] IN BLOOD BY AUTOMATED COUNT: 12.6 % (ref 12.3–15.4)
FLUAV RNA RESP QL NAA+PROBE: NOT DETECTED
FLUBV RNA RESP QL NAA+PROBE: NOT DETECTED
GFR SERPL CREATININE-BSD FRML MDRD: 76 ML/MIN/1.73
GLOBULIN UR ELPH-MCNC: 3 GM/DL
GLUCOSE SERPL-MCNC: 98 MG/DL (ref 65–99)
GLUCOSE UR STRIP-MCNC: NEGATIVE MG/DL
HCT VFR BLD AUTO: 40 % (ref 34–46.6)
HGB BLD-MCNC: 13.6 G/DL (ref 12–15.9)
HGB UR QL STRIP.AUTO: NEGATIVE
HOLD SPECIMEN: NORMAL
IMM GRANULOCYTES # BLD AUTO: 0.03 10*3/MM3 (ref 0–0.05)
IMM GRANULOCYTES NFR BLD AUTO: 0.6 % (ref 0–0.5)
KETONES UR QL STRIP: NEGATIVE
LEUKOCYTE ESTERASE UR QL STRIP.AUTO: NEGATIVE
LYMPHOCYTES # BLD AUTO: 1.5 10*3/MM3 (ref 0.7–3.1)
LYMPHOCYTES NFR BLD AUTO: 30.4 % (ref 19.6–45.3)
MCH RBC QN AUTO: 28.5 PG (ref 26.6–33)
MCHC RBC AUTO-ENTMCNC: 34 G/DL (ref 31.5–35.7)
MCV RBC AUTO: 83.7 FL (ref 79–97)
MONOCYTES # BLD AUTO: 0.5 10*3/MM3 (ref 0.1–0.9)
MONOCYTES NFR BLD AUTO: 10.1 % (ref 5–12)
NEUTROPHILS NFR BLD AUTO: 2.85 10*3/MM3 (ref 1.7–7)
NEUTROPHILS NFR BLD AUTO: 57.9 % (ref 42.7–76)
NITRITE UR QL STRIP: NEGATIVE
NRBC BLD AUTO-RTO: 0 /100 WBC (ref 0–0.2)
PH UR STRIP.AUTO: 7 [PH] (ref 5–9)
PLATELET # BLD AUTO: 172 10*3/MM3 (ref 140–450)
PMV BLD AUTO: 9.8 FL (ref 6–12)
POTASSIUM SERPL-SCNC: 3.7 MMOL/L (ref 3.5–5.2)
PROT SERPL-MCNC: 6.9 G/DL (ref 6–8.5)
PROT UR QL STRIP: NEGATIVE
RBC # BLD AUTO: 4.78 10*6/MM3 (ref 3.77–5.28)
SARS-COV-2 RNA RESP QL NAA+PROBE: DETECTED
SODIUM SERPL-SCNC: 138 MMOL/L (ref 136–145)
SP GR UR STRIP: 1.01 (ref 1–1.03)
UROBILINOGEN UR QL STRIP: NORMAL
WBC # BLD AUTO: 4.93 10*3/MM3 (ref 3.4–10.8)
WHOLE BLOOD HOLD SPECIMEN: NORMAL

## 2020-12-26 PROCEDURE — 81003 URINALYSIS AUTO W/O SCOPE: CPT | Performed by: EMERGENCY MEDICINE

## 2020-12-26 PROCEDURE — 85025 COMPLETE CBC W/AUTO DIFF WBC: CPT | Performed by: EMERGENCY MEDICINE

## 2020-12-26 PROCEDURE — 96374 THER/PROPH/DIAG INJ IV PUSH: CPT

## 2020-12-26 PROCEDURE — 74177 CT ABD & PELVIS W/CONTRAST: CPT

## 2020-12-26 PROCEDURE — 25010000002 IOPAMIDOL 61 % SOLUTION: Performed by: EMERGENCY MEDICINE

## 2020-12-26 PROCEDURE — 25010000002 KETOROLAC TROMETHAMINE PER 15 MG: Performed by: EMERGENCY MEDICINE

## 2020-12-26 PROCEDURE — 80053 COMPREHEN METABOLIC PANEL: CPT | Performed by: EMERGENCY MEDICINE

## 2020-12-26 RX ORDER — AZITHROMYCIN 500 MG/1
500 TABLET, FILM COATED ORAL DAILY
Qty: 4 TABLET | Refills: 0 | Status: SHIPPED | OUTPATIENT
Start: 2020-12-26 | End: 2021-03-02

## 2020-12-26 RX ORDER — ONDANSETRON 4 MG/1
4 TABLET, ORALLY DISINTEGRATING ORAL EVERY 8 HOURS PRN
Qty: 10 TABLET | Refills: 0 | Status: SHIPPED | OUTPATIENT
Start: 2020-12-26 | End: 2021-03-02

## 2020-12-26 RX ORDER — KETOROLAC TROMETHAMINE 30 MG/ML
30 INJECTION, SOLUTION INTRAMUSCULAR; INTRAVENOUS ONCE
Status: COMPLETED | OUTPATIENT
Start: 2020-12-26 | End: 2020-12-26

## 2020-12-26 RX ORDER — AZITHROMYCIN 250 MG/1
500 TABLET, FILM COATED ORAL ONCE
Status: COMPLETED | OUTPATIENT
Start: 2020-12-26 | End: 2020-12-26

## 2020-12-26 RX ORDER — SODIUM CHLORIDE 0.9 % (FLUSH) 0.9 %
10 SYRINGE (ML) INJECTION AS NEEDED
Status: DISCONTINUED | OUTPATIENT
Start: 2020-12-26 | End: 2020-12-26 | Stop reason: HOSPADM

## 2020-12-26 RX ADMIN — IOPAMIDOL 90 ML: 612 INJECTION, SOLUTION INTRAVENOUS at 01:44

## 2020-12-26 RX ADMIN — KETOROLAC TROMETHAMINE 30 MG: 30 INJECTION, SOLUTION INTRAMUSCULAR; INTRAVENOUS at 00:39

## 2020-12-26 RX ADMIN — AZITHROMYCIN MONOHYDRATE 500 MG: 250 TABLET ORAL at 02:53

## 2020-12-26 NOTE — ED PROVIDER NOTES
Subjective   35-year-old female presents to the emergency department with complaint of abdominal pain, fever and constipation.  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  She is also concerned because her  is undergoing treatment for cancer.  Reports pain is continued in her lower abdomen and low back bilaterally.  Denies shortness of breath or cough.    Family history, surgical history, social history, current medications and allergies are reviewed with the patient and triage documentation and vitals are reviewed.      History provided by:  Patient   used: No        Review of Systems   Constitutional: Positive for fever. Negative for appetite change and chills.   HENT: Negative for congestion and sore throat.    Eyes: Negative for photophobia and visual disturbance.   Respiratory: Negative for cough and shortness of breath.    Cardiovascular: Negative for chest pain and palpitations.   Gastrointestinal: Positive for abdominal pain, constipation and nausea. Negative for vomiting.   Endocrine: Negative for polydipsia, polyphagia and polyuria.   Genitourinary: Negative for dysuria, frequency and urgency.   Musculoskeletal: Positive for back pain and myalgias.   Skin: Negative for rash and wound.   Allergic/Immunologic: Negative.    Neurological: Negative.    Hematological: Negative.    Psychiatric/Behavioral: Negative.        Past Medical History:   Diagnosis Date   • Acute pain    • Acute pharyngitis    • Aphthous ulcer of mouth    • Glossitis    • Hyperlipidemia    • Hypertension     no longer on medication   • MVP (mitral valve prolapse)    • Streptococcal sore throat    • Upper respiratory infection        No Known Allergies    Past Surgical History:   Procedure Laterality Date   •  SECTION      x 2   • D&C WITH SUCTION N/A 2019    Procedure: SUCTION DILATION AND  CURETTAGE;  Surgeon: Evita Dominique MD;  Location: [facility];  Service: Obstetrics/Gynecology   • ENDOMETRIAL ABLATION  2013   • HYSTEROSCOPY N/A 4/19/2019    Procedure: HYSTEROSCOPY,;  Surgeon: Evita Dominique MD;  Location: [facility] OR;  Service: Obstetrics/Gynecology   • LAPAROSCOPIC ASSISTED VAGINAL HYSTERECTOMY N/A 12/9/2019    Procedure: TOTAL LAPAROSCOPIC HYSTERECTOMY;  Surgeon: Javier Turner DO;  Location: [facility] OR;  Service: Obstetrics/Gynecology   • OOPHORECTOMY Left 12/9/2019    Procedure: CYSTOSCOPY;  Surgeon: Javier Turner DO;  Location: [facility] OR;  Service: Obstetrics/Gynecology   • SALPINGECTOMY Bilateral 12/9/2019    Procedure: LEFT OOPHORECTOMY;  Surgeon: Javier Turner DO;  Location: [facility];  Service: Obstetrics/Gynecology   • TUBAL ABDOMINAL LIGATION         Family History   Problem Relation Age of Onset   • Cancer Mother    • Leukemia Mother    • Hypertension Father    • Multiple sclerosis Father    • No Known Problems Brother    • Asthma Daughter    • Arnold-Chiari malformation Son    • No Known Problems Maternal Grandmother    • No Known Problems Maternal Grandfather        Social History     Socioeconomic History   • Marital status:      Spouse name: Not on file   • Number of children: Not on file   • Years of education: Not on file   • Highest education level: Not on file   Tobacco Use   • Smoking status: Never Smoker   • Smokeless tobacco: Never Used   Substance and Sexual Activity   • Alcohol use: No   • Drug use: No   • Sexual activity: Defer           Objective   Physical Exam  Vitals signs and nursing note reviewed.   Constitutional:       General: She is not in acute distress.     Appearance: She is well-developed and normal weight. She is not ill-appearing, toxic-appearing or diaphoretic.   HENT:      Head: Normocephalic.   Eyes:      General: No scleral icterus.     Pupils: Pupils are equal, round, and reactive to light.   Cardiovascular:       Rate and Rhythm: Normal rate and regular rhythm.      Heart sounds: Normal heart sounds. No murmur.   Pulmonary:      Effort: Pulmonary effort is normal. No respiratory distress.      Breath sounds: Normal breath sounds. No wheezing, rhonchi or rales.   Abdominal:      General: Bowel sounds are decreased. There is no distension.      Palpations: Abdomen is soft. There is no hepatomegaly or splenomegaly.      Tenderness: There is abdominal tenderness in the suprapubic area. There is no right CVA tenderness, left CVA tenderness, guarding or rebound.      Hernia: No hernia is present.   Skin:     General: Skin is warm and dry.      Capillary Refill: Capillary refill takes less than 2 seconds.   Neurological:      General: No focal deficit present.      Mental Status: She is alert and oriented to person, place, and time.   Psychiatric:         Mood and Affect: Mood normal.         Behavior: Behavior normal.         Procedures  none         ED Course      Labs Reviewed   COVID-19 AND FLU A/B, NP SWAB IN TRANSPORT MEDIA 8-12 HR TAT - Abnormal; Notable for the following components:       Result Value    COVID19 Detected (*)     All other components within normal limits    Narrative:     Fact sheet for providers: https://www.fda.gov/media/218982/download    Fact sheet for patients: https://www.fda.gov/media/401763/download    Test performed by PCR.  Influenza A and Influenza B negative results should be considered presumptive in samples that have a positive SARS-CoV-2 result.    Competitive inhibition studies showed that SARS-CoV-2 virus, when present at concentrations above 3.6E+04 copies/mL, can inhibit the detection and amplification of influenza A and influenza B virus RNA if present at or below 1.8E+02 copies/mL or 4.9E+02 copies/mL, respectively, and may lead to false negative influenza virus results. If co-infection with influenza A or influenza B virus is suspected in samples with a positive SARS-CoV-2 result,  the sample should be re-tested with another FDA cleared, approved, or authorized influenza test, if influenza virus detection would change clinical management.   COMPREHENSIVE METABOLIC PANEL - Abnormal; Notable for the following components:    Calcium 8.5 (*)     All other components within normal limits    Narrative:     GFR Normal >60  Chronic Kidney Disease <60  Kidney Failure <15     CBC WITH AUTO DIFFERENTIAL - Abnormal; Notable for the following components:    Immature Grans % 0.6 (*)     All other components within normal limits   URINALYSIS W/ MICROSCOPIC IF INDICATED (NO CULTURE) - Normal    Narrative:     Urine microscopic not indicated.   CBC AND DIFFERENTIAL    Narrative:     The following orders were created for panel order CBC & Differential.  Procedure                               Abnormality         Status                     ---------                               -----------         ------                     CBC Auto Differential[[id number]]        Abnormal            Final result                 Please view results for these tests on the individual orders.   EXTRA TUBES    Narrative:     The following orders were created for panel order Extra Tubes.  Procedure                               Abnormality         Status                     ---------                               -----------         ------                     Light Blue Top[[id number]]                                   Final result               Gold Top - SST[[id number]]                                   Final result                 Please view results for these tests on the individual orders.   LIGHT BLUE TOP   GOLD TOP - SST     Ct Abdomen Pelvis With Contrast    Result Date: 12/26/2020  Narrative: EXAM DESCRIPTION: CT ABDOMEN PELVIS W CONTRAST RadLex: CT ABDOMEN PELVIS WITH IV CONTRAST CLINICAL HISTORY: 35 years  Female;  lower abd pain, fever, constipation,; TECHNIQUE: CT of the abdomen and pelvis [with] intravenous contrast. All  CT scans at this facility use dose modulation, iterative reconstruction, and/or weight based dosing when appropriate to reduce radiation dose to as low as reasonably achievable. COMPARISON: 5/13/2019 FINDINGS: Patchy areas of groundglass opacity in the posterior lower lobes Abdomen: Stomach:Within normal limits Liver:No focal lesions. No intrahepatic ductal distention. Gallbladder:Nondistended Pancreas:Within normal limits Spleen:Within normal limits Right kidney:No hydronephrosis. No focal lesion. Left kidney:No hydronephrosis. No focal lesion. Adrenal glands:Within normal limits Vascular structures:Within normal limits Nodes:No lymphadenopathy by size criteria Pelvis: Small bowel:No significant distention. Appendix:Within normal limits Colon:No distention or acute pericolonic edema. No free intraperitoneal fluid or air. Bones: No acute bone findings. Bladder: Unremarkable. No pelvic mass or adenopathy.       Impression: 1.  No acute findings in the abdomen or pelvis. 2.  Patchy areas of groundglass opacity in the lower lobes are nonspecific, but could represent atypical pneumonia Electronically signed by:  Randy See MD  12/26/2020 2:13 AM CST Workstation: [id number]          [facility]  Number of Diagnoses or Management Options  COVID-19 virus infection:   Pneumonia of both lower lobes due to infectious organism:      Amount and/or Complexity of Data Reviewed  Clinical lab tests: reviewed  Tests in the radiology section of CPT®: reviewed    Patient Progress  Patient progress: stable    CT abdomen unremarkable other than bilateral lower lobe infiltrate. Labs overall unremarkable. COVID +. Advised on symptomatic treatment.  Azithromycin. Advised on quarantine, particularly involving her .    Final diagnoses:   COVID-19 virus infection   Pneumonia of both lower lobes due to infectious organism            Shahram Morgan,   12/26/20 0713

## 2020-12-26 NOTE — DISCHARGE INSTRUCTIONS
Please return with new or worsening symptoms.  Use medication to help with nausea and vomiting.  Get antibiotic prescription filled and take as directed.  Can take over-the-counter medications to help with other symptoms.  Drink plenty of fluids, get plenty of rest.  Quarantine as directed.

## 2020-12-28 ENCOUNTER — EPISODE CHANGES (OUTPATIENT)
Dept: CASE MANAGEMENT | Facility: OTHER | Age: 35
End: 2020-12-28

## 2020-12-28 ENCOUNTER — PATIENT OUTREACH (OUTPATIENT)
Dept: CASE MANAGEMENT | Facility: OTHER | Age: 35
End: 2020-12-28

## 2020-12-28 NOTE — OUTREACH NOTE
Care Coordination Note    Called pt's pcp office, Dr Rudy Darden, and informed staff pt was seen at [facility] ED and tested positive for covid.    Suzanna Truong RN  Ambulatory     12/28/2020, 11:25 EST

## 2020-12-28 NOTE — OUTREACH NOTE
ED Potential Covid Discharge Follow-up    Pt discharged from [facility] ED 12/25/20, seen for abdominal pain, fever, constipation, covid testing done, pt covid positive. [facility] outreach call made to pt. Pt reports she received positive covid result at the ED. Pt c/o aching all over, constipation- last bm 2 days ago per pt. Reports subjective fever at night, no appetite. Denies n/v, cough, chest pain, reports sob after a bath yesterday. Reviewed ED AVS with pt. Education provided. Reviewed quarantine instructions, symptom management, symptoms to monitor, when to seek medical attention. Pt reports taking abx as directed. Emphasized importance of completing abx. Pt denies food, medication, or transportation insecurities. Discussed pcp f/u. Pt reports pcp is Dr Rudy Darden (care team updated). Pt declines [facility] assistance with scheduling pcp f/u appt, states she can schedule. No questions or concerns per pt. Covid hotline and 24/7 nurse line provided to pt. Advised pt to call with any needs. Follow up outreach as needed.    Suzanna Truong RN  Ambulatory     12/28/2020, 11:07 EST

## 2021-03-02 ENCOUNTER — OFFICE VISIT (OUTPATIENT)
Dept: CARDIOLOGY | Facility: CLINIC | Age: 36
End: 2021-03-02

## 2021-03-02 VITALS
HEART RATE: 57 BPM | OXYGEN SATURATION: 98 % | WEIGHT: 253.4 LBS | BODY MASS INDEX: 42.22 KG/M2 | SYSTOLIC BLOOD PRESSURE: 142 MMHG | HEIGHT: 65 IN | DIASTOLIC BLOOD PRESSURE: 90 MMHG

## 2021-03-02 DIAGNOSIS — I34.1 MVP (MITRAL VALVE PROLAPSE): ICD-10-CM

## 2021-03-02 DIAGNOSIS — R00.2 PALPITATIONS: Primary | ICD-10-CM

## 2021-03-02 LAB
QT INTERVAL: 412 MS
QTC INTERVAL: 401 MS

## 2021-03-02 PROCEDURE — 93000 ELECTROCARDIOGRAM COMPLETE: CPT | Performed by: INTERNAL MEDICINE

## 2021-03-02 PROCEDURE — 99213 OFFICE O/P EST LOW 20 MIN: CPT | Performed by: INTERNAL MEDICINE

## 2021-03-02 RX ORDER — PAROXETINE HYDROCHLORIDE 20 MG/1
TABLET, FILM COATED ORAL
COMMUNITY
Start: 2020-11-24 | End: 2021-11-21

## 2021-03-02 NOTE — PROGRESS NOTES
Narda Felix Ray  35 y.o. female    2019  1. Palpitations    2. MVP (mitral valve prolapse)        History of Present Illness:  Body mass index is 42.17 kg/m². BMI is above normal parameters. Recommendations include: exercise counseling, nutrition counseling and referral to primary care.      35 years old patient who presented for routine follow-up no symptoms of cardiac decompensation reported no orthopnea PND palpitation syncope or near syncopal episode reported.  Patient was previously evaluated with Dr. Hennessy with echo .history of mitral valve prolapse.  Echo 2018 at Dr. Hennessy's office reported ejection fractions 50-55% mild mitral valve prolapse with mitral valve thickening with a trace mitral and mild tricuspid regurgitation.  No further palpitation reported.  She is physically active and walk almost every day more than 2-3 mile without symptom of dizziness shortness of breath chest pain or claudications.  She is referred for preop evaluation as she is scheduled to have dilatation and curette for some uterine pathology showed a family history of cancer.  No chest pain reported.  Fortunately she does not smoke        Total Cholesterol   0 - 199 mg/dL 221High     Triglycerides   20 - 199 mg/dL 191    HDL Cholesterol   60 - 200 mg/dL 38Low     LDL Cholesterol    1 - 129 mg/dL 136High     LDL/HDL Ratio   0.00 - 3.22 3.81High           SUBJECTIVE:    No Known Allergies      Past Medical History:   Diagnosis Date   • Acute pain    • Acute pharyngitis    • Aphthous ulcer of mouth    • Glossitis    • Hyperlipidemia    • Hypertension     no longer on medication   • MVP (mitral valve prolapse)    • Streptococcal sore throat    • Upper respiratory infection          Past Surgical History:   Procedure Laterality Date   •  SECTION      x 2   • D&C WITH SUCTION N/A 2019    Procedure: SUCTION DILATION AND CURETTAGE;  Surgeon: Evita Dominique MD;  Location: [facility];  Service:  Obstetrics/Gynecology   • ENDOMETRIAL ABLATION  2013   • HYSTEROSCOPY N/A 4/19/2019    Procedure: HYSTEROSCOPY,;  Surgeon: Evita Dominique MD;  Location: [facility];  Service: Obstetrics/Gynecology   • LAPAROSCOPIC ASSISTED VAGINAL HYSTERECTOMY N/A 12/9/2019    Procedure: TOTAL LAPAROSCOPIC HYSTERECTOMY;  Surgeon: Javier Turner DO;  Location: [facility] OR;  Service: Obstetrics/Gynecology   • OOPHORECTOMY Left 12/9/2019    Procedure: CYSTOSCOPY;  Surgeon: Javier Turner DO;  Location: [facility] OR;  Service: Obstetrics/Gynecology   • SALPINGECTOMY Bilateral 12/9/2019    Procedure: LEFT OOPHORECTOMY;  Surgeon: Javier Turner DO;  Location: [facility] OR;  Service: Obstetrics/Gynecology   • TUBAL ABDOMINAL LIGATION           Family History   Problem Relation Age of Onset   • Cancer Mother    • Leukemia Mother    • Hypertension Father    • Multiple sclerosis Father    • No Known Problems Brother    • Asthma Daughter    • Arnold-Chiari malformation Son    • No Known Problems Maternal Grandmother    • No Known Problems Maternal Grandfather          Social History     Socioeconomic History   • Marital status:      Spouse name: Not on file   • Number of children: Not on file   • Years of education: Not on file   • Highest education level: Not on file   Tobacco Use   • Smoking status: Never Smoker   • Smokeless tobacco: Never Used   Substance and Sexual Activity   • Alcohol use: No   • Drug use: No   • Sexual activity: Defer         Current Outpatient Medications   Medication Sig Dispense Refill   • PARoxetine (PAXIL) 20 MG tablet        No current facility-administered medications for this visit.            Review of Systems:     Constitutional:  Denies recent weight loss, weight gain no change in exercise tolerance.     HENT:  Denies any hearing loss, epistaxis,  Eyes: Wears eyeglasses or contact lenses.    Respiratory:  Denies dyspnea with exertion,    Cardiovascular: See  "H&P    Gastrointestinal:  Denies change in bowel habits, dyspepsia    Endocrine: Negative for cold intolerance, heat intolerance, polydipsia, polyphagia and polyuria.     Genitourinary: No symptoms reported      Musculoskeletal: Denies any history of arthritic symptoms or back problems.     Skin:  Denies  rashes, or skin lesions.     Allergic/Immunologic: Negative.  Negative for environmental allergies,    Neurological:  Denies  , strokes, TIA, or seizure disorder.     Hematological: Denies any food allergies, seasonal allergies    Psychiatric/Behavioral: Denies any history of depression,      OBJECTIVE:    /90   Pulse 57   Ht 165.1 cm (65\")   Wt 115 kg (253 lb 6.4 oz)   LMP 11/25/2019   SpO2 98%   BMI 42.17 kg/m²       Physical Exam:     Constitutional: Cooperative, alert and oriented, well-developed, well-nourished, in no acute distress.     HENT:   Head: Normocephalic atraumatic conjunctive is pink thyroid is nonpalpable no jugular is distention    Cardiovascular: Regular rhythm, S1 and S2 normal, no S3 or S4. Apical impulse not displaced. No murmurs    Pulmonary/Chest: Chest: No rales and wheezing    Abdominal: Abdomen soft, bowel sounds normoactive,    Musculoskeletal: Straight peripheral pulses no edema.     Neurological: No gross motor or sensory deficits noted,     Skin: Warm and dry to the touch, no apparent skin lesions     Psychiatric: She has a normal mood and affect. Her behavior is normal.         Procedures      Lab Results   Component Value Date    WBC 4.93 12/26/2020    HGB 13.6 12/26/2020    HCT 40.0 12/26/2020    MCV 83.7 12/26/2020     12/26/2020     Lab Results   Component Value Date    GLUCOSE 98 12/26/2020    BUN 8 12/26/2020    CREATININE 0.86 12/26/2020    EGFRIFNONA 76 12/26/2020    BCR 9.3 12/26/2020    CO2 24.0 12/26/2020    CALCIUM 8.5 (L) 12/26/2020    ALBUMIN 3.90 12/26/2020    AST 17 12/26/2020    ALT 12 12/26/2020     Lab Results   Component Value Date    CHOL 221 " (H) 03/22/2019     Lab Results   Component Value Date    TRIG 191 03/22/2019     Lab Results   Component Value Date    HDL 38 (L) 03/22/2019     No components found for: LDLCALC  Lab Results   Component Value Date     (H) 03/22/2019     No results found for: HDLLDLRATIO  No components found for: CHOLHDL  Lab Results   Component Value Date    HGBA1C 5.5 03/22/2019     Lab Results   Component Value Date    TSH 1.620 03/22/2019           ASSESSMENT AND PLAN    #1 mitral valve prolapse with trace mitral mild tricuspid regurgitation    Patient presented for routine follow-up and no symptom suggestive of cardiac decompensation reported.  Clinical denies any progression of her underlying valvular heart disease.  I will arrange an echocardiogram 1 year from today just after or prior to the presentation for follow-up.    #2 palpitation with history of sinus tachycardia no further recurrence.  She is a pleased with clinical outcome.      #3 prevention    Given the morbid obesity with BMI 42.17.  Significantly low carbohydrate, low-fat, DASH diet graded exercise discussed with the patient.  Patient was educated regarding quality and quantity of food intake particular given the lipid abnormality.            Diagnoses and all orders for this visit:    1. Palpitations (Primary)  -     ECG 12 Lead    2. MVP (mitral valve prolapse)          Raul Gloria MD  3/2/2021  09:03 CST

## 2021-11-21 ENCOUNTER — HOSPITAL ENCOUNTER (OUTPATIENT)
Facility: HOSPITAL | Age: 36
Setting detail: OBSERVATION
Discharge: HOME | End: 2021-11-22
Attending: FAMILY MEDICINE | Admitting: FAMILY MEDICINE

## 2021-11-21 ENCOUNTER — APPOINTMENT (OUTPATIENT)
Dept: GENERAL RADIOLOGY | Facility: HOSPITAL | Age: 36
End: 2021-11-21

## 2021-11-21 ENCOUNTER — OUTSIDE FACILITY SERVICE (OUTPATIENT)
Dept: CARDIOLOGY | Facility: CLINIC | Age: 36
End: 2021-11-21

## 2021-11-21 DIAGNOSIS — R03.0 ELEVATED BLOOD PRESSURE READING: ICD-10-CM

## 2021-11-21 DIAGNOSIS — R07.89 OTHER CHEST PAIN: ICD-10-CM

## 2021-11-21 DIAGNOSIS — R07.9 CHEST PAIN, UNSPECIFIED TYPE: Primary | ICD-10-CM

## 2021-11-21 LAB
ALBUMIN SERPL-MCNC: 4.2 G/DL (ref 3.5–5.2)
ALBUMIN/GLOB SERPL: 1.1 G/DL
ALP SERPL-CCNC: 111 U/L (ref 39–117)
ALT SERPL W P-5'-P-CCNC: 15 U/L (ref 1–33)
ANION GAP SERPL CALCULATED.3IONS-SCNC: 12 MMOL/L (ref 5–15)
ANION GAP SERPL CALCULATED.3IONS-SCNC: 12 MMOL/L (ref 5–15)
AST SERPL-CCNC: 16 U/L (ref 1–32)
BASOPHILS # BLD AUTO: 0.08 10*3/MM3 (ref 0–0.2)
BASOPHILS # BLD AUTO: 0.08 10*3/MM3 (ref 0–0.2)
BASOPHILS NFR BLD AUTO: 0.7 % (ref 0–1.5)
BASOPHILS NFR BLD AUTO: 0.8 % (ref 0–1.5)
BILIRUB SERPL-MCNC: 0.4 MG/DL (ref 0–1.2)
BUN SERPL-MCNC: 7 MG/DL (ref 6–20)
BUN SERPL-MCNC: 7 MG/DL (ref 6–20)
BUN/CREAT SERPL: 8.1 (ref 7–25)
BUN/CREAT SERPL: 8.4 (ref 7–25)
CALCIUM SPEC-SCNC: 9.5 MG/DL (ref 8.6–10.5)
CALCIUM SPEC-SCNC: 9.5 MG/DL (ref 8.6–10.5)
CHLORIDE SERPL-SCNC: 101 MMOL/L (ref 98–107)
CHLORIDE SERPL-SCNC: 102 MMOL/L (ref 98–107)
CO2 SERPL-SCNC: 24 MMOL/L (ref 22–29)
CO2 SERPL-SCNC: 24 MMOL/L (ref 22–29)
CREAT SERPL-MCNC: 0.83 MG/DL (ref 0.57–1)
CREAT SERPL-MCNC: 0.86 MG/DL (ref 0.57–1)
D-DIMER, QUANTITATIVE (MAD,POW, STR): <270 NG/ML (FEU) (ref 0–470)
DEPRECATED RDW RBC AUTO: 38.2 FL (ref 37–54)
DEPRECATED RDW RBC AUTO: 38.3 FL (ref 37–54)
EOSINOPHIL # BLD AUTO: 0.1 10*3/MM3 (ref 0–0.4)
EOSINOPHIL # BLD AUTO: 0.1 10*3/MM3 (ref 0–0.4)
EOSINOPHIL NFR BLD AUTO: 0.8 % (ref 0.3–6.2)
EOSINOPHIL NFR BLD AUTO: 1 % (ref 0.3–6.2)
ERYTHROCYTE [DISTWIDTH] IN BLOOD BY AUTOMATED COUNT: 13 % (ref 12.3–15.4)
ERYTHROCYTE [DISTWIDTH] IN BLOOD BY AUTOMATED COUNT: 13.2 % (ref 12.3–15.4)
FLUAV RNA RESP QL NAA+PROBE: NOT DETECTED
FLUBV RNA RESP QL NAA+PROBE: NOT DETECTED
GFR SERPL CREATININE-BSD FRML MDRD: 75 ML/MIN/1.73
GFR SERPL CREATININE-BSD FRML MDRD: 78 ML/MIN/1.73
GLOBULIN UR ELPH-MCNC: 3.8 GM/DL
GLUCOSE SERPL-MCNC: 97 MG/DL (ref 65–99)
GLUCOSE SERPL-MCNC: 99 MG/DL (ref 65–99)
HCT VFR BLD AUTO: 42.8 % (ref 34–46.6)
HCT VFR BLD AUTO: 43 % (ref 34–46.6)
HGB BLD-MCNC: 15 G/DL (ref 12–15.9)
HGB BLD-MCNC: 15.2 G/DL (ref 12–15.9)
HOLD SPECIMEN: NORMAL
HOLD SPECIMEN: NORMAL
IMM GRANULOCYTES # BLD AUTO: 0.06 10*3/MM3 (ref 0–0.05)
IMM GRANULOCYTES # BLD AUTO: 0.08 10*3/MM3 (ref 0–0.05)
IMM GRANULOCYTES NFR BLD AUTO: 0.6 % (ref 0–0.5)
IMM GRANULOCYTES NFR BLD AUTO: 0.7 % (ref 0–0.5)
LIPASE SERPL-CCNC: 19 U/L (ref 13–60)
LYMPHOCYTES # BLD AUTO: 2.69 10*3/MM3 (ref 0.7–3.1)
LYMPHOCYTES # BLD AUTO: 3.4 10*3/MM3 (ref 0.7–3.1)
LYMPHOCYTES NFR BLD AUTO: 25.6 % (ref 19.6–45.3)
LYMPHOCYTES NFR BLD AUTO: 28 % (ref 19.6–45.3)
MAGNESIUM SERPL-MCNC: 2 MG/DL (ref 1.6–2.6)
MCH RBC QN AUTO: 28.5 PG (ref 26.6–33)
MCH RBC QN AUTO: 28.8 PG (ref 26.6–33)
MCHC RBC AUTO-ENTMCNC: 34.9 G/DL (ref 31.5–35.7)
MCHC RBC AUTO-ENTMCNC: 35.5 G/DL (ref 31.5–35.7)
MCV RBC AUTO: 81.2 FL (ref 79–97)
MCV RBC AUTO: 81.6 FL (ref 79–97)
MONOCYTES # BLD AUTO: 0.59 10*3/MM3 (ref 0.1–0.9)
MONOCYTES # BLD AUTO: 0.64 10*3/MM3 (ref 0.1–0.9)
MONOCYTES NFR BLD AUTO: 5.3 % (ref 5–12)
MONOCYTES NFR BLD AUTO: 5.6 % (ref 5–12)
NEUTROPHILS NFR BLD AUTO: 6.98 10*3/MM3 (ref 1.7–7)
NEUTROPHILS NFR BLD AUTO: 64.5 % (ref 42.7–76)
NEUTROPHILS NFR BLD AUTO: 66.4 % (ref 42.7–76)
NEUTROPHILS NFR BLD AUTO: 7.86 10*3/MM3 (ref 1.7–7)
NRBC BLD AUTO-RTO: 0 /100 WBC (ref 0–0.2)
NRBC BLD AUTO-RTO: 0 /100 WBC (ref 0–0.2)
NT-PROBNP SERPL-MCNC: 30.4 PG/ML (ref 0–450)
PLATELET # BLD AUTO: 316 10*3/MM3 (ref 140–450)
PLATELET # BLD AUTO: 360 10*3/MM3 (ref 140–450)
PMV BLD AUTO: 9.7 FL (ref 6–12)
PMV BLD AUTO: 9.9 FL (ref 6–12)
POTASSIUM SERPL-SCNC: 3.6 MMOL/L (ref 3.5–5.2)
POTASSIUM SERPL-SCNC: 3.7 MMOL/L (ref 3.5–5.2)
PROT SERPL-MCNC: 8 G/DL (ref 6–8.5)
RBC # BLD AUTO: 5.27 10*6/MM3 (ref 3.77–5.28)
RBC # BLD AUTO: 5.27 10*6/MM3 (ref 3.77–5.28)
SARS-COV-2 RNA RESP QL NAA+PROBE: NOT DETECTED
SODIUM SERPL-SCNC: 137 MMOL/L (ref 136–145)
SODIUM SERPL-SCNC: 138 MMOL/L (ref 136–145)
TROPONIN T SERPL-MCNC: <0.01 NG/ML (ref 0–0.03)
WBC NRBC COR # BLD: 10.5 10*3/MM3 (ref 3.4–10.8)
WBC NRBC COR # BLD: 12.16 10*3/MM3 (ref 3.4–10.8)
WHOLE BLOOD HOLD SPECIMEN: NORMAL
WHOLE BLOOD HOLD SPECIMEN: NORMAL

## 2021-11-21 PROCEDURE — 93005 ELECTROCARDIOGRAM TRACING: CPT | Performed by: FAMILY MEDICINE

## 2021-11-21 PROCEDURE — 93010 ELECTROCARDIOGRAM REPORT: CPT | Performed by: INTERNAL MEDICINE

## 2021-11-21 PROCEDURE — 84484 ASSAY OF TROPONIN QUANT: CPT | Performed by: FAMILY MEDICINE

## 2021-11-21 PROCEDURE — 96375 TX/PRO/DX INJ NEW DRUG ADDON: CPT

## 2021-11-21 PROCEDURE — 25010000002 MORPHINE PER 10 MG: Performed by: FAMILY MEDICINE

## 2021-11-21 PROCEDURE — 93005 ELECTROCARDIOGRAM TRACING: CPT | Performed by: PHYSICIAN ASSISTANT

## 2021-11-21 PROCEDURE — 25010000002 LORAZEPAM PER 2 MG

## 2021-11-21 PROCEDURE — 25010000002 HYDROMORPHONE 1 MG/ML SOLUTION: Performed by: FAMILY MEDICINE

## 2021-11-21 PROCEDURE — 85025 COMPLETE CBC W/AUTO DIFF WBC: CPT | Performed by: FAMILY MEDICINE

## 2021-11-21 PROCEDURE — C9803 HOPD COVID-19 SPEC COLLECT: HCPCS

## 2021-11-21 PROCEDURE — 85025 COMPLETE CBC W/AUTO DIFF WBC: CPT | Performed by: PHYSICIAN ASSISTANT

## 2021-11-21 PROCEDURE — 87636 SARSCOV2 & INF A&B AMP PRB: CPT | Performed by: FAMILY MEDICINE

## 2021-11-21 PROCEDURE — 25010000002 ONDANSETRON PER 1 MG: Performed by: PHYSICIAN ASSISTANT

## 2021-11-21 PROCEDURE — 84484 ASSAY OF TROPONIN QUANT: CPT | Performed by: PHYSICIAN ASSISTANT

## 2021-11-21 PROCEDURE — 36415 COLL VENOUS BLD VENIPUNCTURE: CPT | Performed by: FAMILY MEDICINE

## 2021-11-21 PROCEDURE — 99285 EMERGENCY DEPT VISIT HI MDM: CPT

## 2021-11-21 PROCEDURE — 96374 THER/PROPH/DIAG INJ IV PUSH: CPT

## 2021-11-21 PROCEDURE — 71045 X-RAY EXAM CHEST 1 VIEW: CPT

## 2021-11-21 PROCEDURE — 83735 ASSAY OF MAGNESIUM: CPT | Performed by: FAMILY MEDICINE

## 2021-11-21 PROCEDURE — G0378 HOSPITAL OBSERVATION PER HR: HCPCS

## 2021-11-21 PROCEDURE — 80053 COMPREHEN METABOLIC PANEL: CPT | Performed by: PHYSICIAN ASSISTANT

## 2021-11-21 PROCEDURE — 83880 ASSAY OF NATRIURETIC PEPTIDE: CPT | Performed by: PHYSICIAN ASSISTANT

## 2021-11-21 PROCEDURE — 83690 ASSAY OF LIPASE: CPT | Performed by: PHYSICIAN ASSISTANT

## 2021-11-21 PROCEDURE — 85379 FIBRIN DEGRADATION QUANT: CPT | Performed by: PHYSICIAN ASSISTANT

## 2021-11-21 RX ORDER — NALOXONE HCL 0.4 MG/ML
0.4 VIAL (ML) INJECTION
Status: DISCONTINUED | OUTPATIENT
Start: 2021-11-21 | End: 2021-11-22 | Stop reason: HOSPADM

## 2021-11-21 RX ORDER — TEMAZEPAM 7.5 MG/1
7.5 CAPSULE ORAL NIGHTLY
COMMUNITY
End: 2022-02-11

## 2021-11-21 RX ORDER — ONDANSETRON 2 MG/ML
4 INJECTION INTRAMUSCULAR; INTRAVENOUS ONCE
Status: COMPLETED | OUTPATIENT
Start: 2021-11-21 | End: 2021-11-21

## 2021-11-21 RX ORDER — LANOLIN ALCOHOL/MO/W.PET/CERES
6 CREAM (GRAM) TOPICAL NIGHTLY PRN
Status: DISCONTINUED | OUTPATIENT
Start: 2021-11-21 | End: 2021-11-22 | Stop reason: HOSPADM

## 2021-11-21 RX ORDER — LORAZEPAM 2 MG/ML
1 INJECTION INTRAMUSCULAR ONCE
Status: COMPLETED | OUTPATIENT
Start: 2021-11-21 | End: 2021-11-21

## 2021-11-21 RX ORDER — ASPIRIN 81 MG/1
324 TABLET, CHEWABLE ORAL ONCE
Status: DISCONTINUED | OUTPATIENT
Start: 2021-11-21 | End: 2021-11-22 | Stop reason: HOSPADM

## 2021-11-21 RX ORDER — MORPHINE SULFATE 2 MG/ML
1 INJECTION, SOLUTION INTRAMUSCULAR; INTRAVENOUS EVERY 4 HOURS PRN
Status: DISCONTINUED | OUTPATIENT
Start: 2021-11-21 | End: 2021-11-22 | Stop reason: HOSPADM

## 2021-11-21 RX ORDER — LORAZEPAM 2 MG/ML
INJECTION INTRAMUSCULAR
Status: COMPLETED
Start: 2021-11-21 | End: 2021-11-21

## 2021-11-21 RX ORDER — ONDANSETRON 2 MG/ML
4 INJECTION INTRAMUSCULAR; INTRAVENOUS EVERY 6 HOURS PRN
Status: DISCONTINUED | OUTPATIENT
Start: 2021-11-21 | End: 2021-11-22 | Stop reason: HOSPADM

## 2021-11-21 RX ORDER — LABETALOL HYDROCHLORIDE 5 MG/ML
20 INJECTION, SOLUTION INTRAVENOUS ONCE
Status: DISCONTINUED | OUTPATIENT
Start: 2021-11-21 | End: 2021-11-22 | Stop reason: HOSPADM

## 2021-11-21 RX ORDER — FAMOTIDINE 10 MG/ML
20 INJECTION, SOLUTION INTRAVENOUS ONCE
Status: DISCONTINUED | OUTPATIENT
Start: 2021-11-21 | End: 2021-11-22 | Stop reason: HOSPADM

## 2021-11-21 RX ORDER — SODIUM CHLORIDE 0.9 % (FLUSH) 0.9 %
10 SYRINGE (ML) INJECTION AS NEEDED
Status: DISCONTINUED | OUTPATIENT
Start: 2021-11-21 | End: 2021-11-22 | Stop reason: HOSPADM

## 2021-11-21 RX ORDER — SODIUM CHLORIDE 0.9 % (FLUSH) 0.9 %
10 SYRINGE (ML) INJECTION EVERY 12 HOURS SCHEDULED
Status: DISCONTINUED | OUTPATIENT
Start: 2021-11-21 | End: 2021-11-22 | Stop reason: HOSPADM

## 2021-11-21 RX ORDER — ONDANSETRON 2 MG/ML
4 INJECTION INTRAMUSCULAR; INTRAVENOUS ONCE
Status: DISCONTINUED | OUTPATIENT
Start: 2021-11-21 | End: 2021-11-21

## 2021-11-21 RX ORDER — PAROXETINE HYDROCHLORIDE 20 MG/1
20 TABLET, FILM COATED ORAL DAILY
Status: DISCONTINUED | OUTPATIENT
Start: 2021-11-21 | End: 2021-11-22 | Stop reason: HOSPADM

## 2021-11-21 RX ADMIN — ONDANSETRON 4 MG: 2 INJECTION INTRAMUSCULAR; INTRAVENOUS at 17:01

## 2021-11-21 RX ADMIN — MELATONIN 6 MG: 3 TAB ORAL at 22:59

## 2021-11-21 RX ADMIN — LORAZEPAM 1 MG: 2 INJECTION INTRAMUSCULAR; INTRAVENOUS at 18:40

## 2021-11-21 RX ADMIN — NITROGLYCERIN 1 INCH: 20 OINTMENT TOPICAL at 20:03

## 2021-11-21 RX ADMIN — LORAZEPAM 1 MG: 2 INJECTION INTRAMUSCULAR at 18:40

## 2021-11-21 RX ADMIN — SODIUM CHLORIDE, PRESERVATIVE FREE 10 ML: 5 INJECTION INTRAVENOUS at 20:03

## 2021-11-21 RX ADMIN — MORPHINE SULFATE 4 MG: 4 INJECTION INTRAVENOUS at 17:00

## 2021-11-21 RX ADMIN — HYDROMORPHONE HYDROCHLORIDE 0.5 MG: 1 INJECTION, SOLUTION INTRAMUSCULAR; INTRAVENOUS; SUBCUTANEOUS at 20:02

## 2021-11-21 NOTE — ED PROVIDER NOTES
Subjective   Patient presents to emergency department for intermittent chest/epigastric pain since Friday.  States she feels it may be partially due to anxiety due to her  recently passing from cancer.  Also states her sleep has been poor over the past 4 months.  Hx of hypertension which she states she is noncompliant with her medication due to is making her feel malaise, HLD, MVP.        History provided by:  Patient   used: No        Review of Systems   Constitutional: Negative for chills and fever.   HENT: Negative for sore throat and trouble swallowing.    Eyes: Negative for visual disturbance.   Respiratory: Negative for cough, shortness of breath and wheezing.    Cardiovascular: Negative for chest pain.   Gastrointestinal: Positive for nausea. Negative for abdominal pain and vomiting.   Genitourinary: Negative for dysuria and flank pain.   Musculoskeletal: Negative for back pain.   Allergic/Immunologic: Negative for immunocompromised state.   Neurological: Positive for headaches.   Hematological: Does not bruise/bleed easily.   Psychiatric/Behavioral: Negative for confusion. The patient is nervous/anxious.        Past Medical History:   Diagnosis Date   • Acute pain    • Acute pharyngitis    • Aphthous ulcer of mouth    • Glossitis    • Hyperlipidemia    • Hypertension     no longer on medication   • MVP (mitral valve prolapse)    • Streptococcal sore throat    • Upper respiratory infection        No Known Allergies    Past Surgical History:   Procedure Laterality Date   •  SECTION      x 2   • D & C WITH SUCTION N/A 2019    Procedure: SUCTION DILATION AND CURETTAGE;  Surgeon: Evita Dominique MD;  Location: [facility];  Service: Obstetrics/Gynecology   • ENDOMETRIAL ABLATION     • HYSTEROSCOPY N/A 2019    Procedure: HYSTEROSCOPY,;  Surgeon: Evita Dominique MD;  Location: [facility];  Service: Obstetrics/Gynecology   • LAPAROSCOPIC ASSISTED VAGINAL  "HYSTERECTOMY N/A 12/9/2019    Procedure: TOTAL LAPAROSCOPIC HYSTERECTOMY;  Surgeon: Javier Turner DO;  Location: [facility] OR;  Service: Obstetrics/Gynecology   • OOPHORECTOMY Left 12/9/2019    Procedure: CYSTOSCOPY;  Surgeon: Javier Turner DO;  Location: [facility] OR;  Service: Obstetrics/Gynecology   • SALPINGECTOMY Bilateral 12/9/2019    Procedure: LEFT OOPHORECTOMY;  Surgeon: Javier Turner DO;  Location: [facility];  Service: Obstetrics/Gynecology   • TUBAL ABDOMINAL LIGATION         Family History   Problem Relation Age of Onset   • Cancer Mother    • Leukemia Mother    • Hypertension Father    • Multiple sclerosis Father    • No Known Problems Brother    • Asthma Daughter    • Arnold-Chiari malformation Son    • No Known Problems Maternal Grandmother    • No Known Problems Maternal Grandfather        Social History     Socioeconomic History   • Marital status:    Tobacco Use   • Smoking status: Never Smoker   • Smokeless tobacco: Never Used   Vaping Use   • Vaping Use: Never used   Substance and Sexual Activity   • Alcohol use: No   • Drug use: No   • Sexual activity: Defer           Objective      /89   Pulse 88   Temp 98.1 °F (36.7 °C) (Oral)   Resp 20   Ht 165.1 cm (65\")   Wt 118 kg (260 lb)   LMP 11/25/2019   SpO2 95%   BMI 43.27 kg/m²     Physical Exam  Vitals and nursing note reviewed.   Constitutional:       Appearance: She is well-developed.   HENT:      Head: Normocephalic and atraumatic.      Mouth/Throat:      Pharynx: Oropharynx is clear.   Eyes:      Conjunctiva/sclera: Conjunctivae normal.   Cardiovascular:      Rate and Rhythm: Normal rate and regular rhythm.      Pulses: Normal pulses.      Heart sounds: Normal heart sounds.   Pulmonary:      Effort: Pulmonary effort is normal. No respiratory distress.      Breath sounds: Normal breath sounds. No wheezing.   Abdominal:      General: There is no distension.      Tenderness: There is no abdominal " tenderness.   Skin:     General: Skin is warm.      Capillary Refill: Capillary refill takes less than 2 seconds.   Neurological:      Mental Status: She is alert. Mental status is at baseline.   Psychiatric:         Mood and Affect: Mood normal.         Thought Content: Thought content normal.         ECG 12 Lead      Date/Time: 11/21/2021 4:49 PM  Performed by: Samuel Nweton PA-C  Authorized by: Eliceo Pires MD   Interpreted by physician  Comparison: compared with previous ECG from 3/2/2021  Rhythm: sinus rhythm  Rate: normal  BPM: 89  ST Segments: ST segments normal  Clinical impression: normal ECG      ECG 12 Lead      Date/Time: 11/21/2021 6:34 PM  Performed by: Samuel Newton PA-C  Authorized by: Samuel Newton PA-C   Interpreted by physician  Comparison: compared with previous ECG from 11/21/2021  Similar to previous ECG  Rhythm: sinus rhythm  Rate: normal  BPM: 94  ST Segments: ST segments normal  Clinical impression: abnormal ECG                 ED Course      Results for orders placed or performed during the hospital encounter of 11/21/21   Troponin    Specimen: Blood   Result Value Ref Range    Troponin T <0.010 0.000 - 0.030 ng/mL   Comprehensive Metabolic Panel    Specimen: Blood   Result Value Ref Range    Glucose 99 65 - 99 mg/dL    BUN 7 6 - 20 mg/dL    Creatinine 0.83 0.57 - 1.00 mg/dL    Sodium 138 136 - 145 mmol/L    Potassium 3.6 3.5 - 5.2 mmol/L    Chloride 102 98 - 107 mmol/L    CO2 24.0 22.0 - 29.0 mmol/L    Calcium 9.5 8.6 - 10.5 mg/dL    Total Protein 8.0 6.0 - 8.5 g/dL    Albumin 4.20 3.50 - 5.20 g/dL    ALT (SGPT) 15 1 - 33 U/L    AST (SGOT) 16 1 - 32 U/L    Alkaline Phosphatase 111 39 - 117 U/L    Total Bilirubin 0.4 0.0 - 1.2 mg/dL    eGFR Non African Amer 78 >60 mL/min/1.73    Globulin 3.8 gm/dL    A/G Ratio 1.1 g/dL    BUN/Creatinine Ratio 8.4 7.0 - 25.0    Anion Gap 12.0 5.0 - 15.0 mmol/L   BNP    Specimen: Blood   Result Value Ref Range    proBNP 30.4  0.0 - 450.0 pg/mL   CBC Auto Differential    Specimen: Blood   Result Value Ref Range    WBC 10.50 3.40 - 10.80 10*3/mm3    RBC 5.27 3.77 - 5.28 10*6/mm3    Hemoglobin 15.0 12.0 - 15.9 g/dL    Hematocrit 43.0 34.0 - 46.6 %    MCV 81.6 79.0 - 97.0 fL    MCH 28.5 26.6 - 33.0 pg    MCHC 34.9 31.5 - 35.7 g/dL    RDW 13.0 12.3 - 15.4 %    RDW-SD 38.3 37.0 - 54.0 fl    MPV 9.7 6.0 - 12.0 fL    Platelets 316 140 - 450 10*3/mm3    Neutrophil % 66.4 42.7 - 76.0 %    Lymphocyte % 25.6 19.6 - 45.3 %    Monocyte % 5.6 5.0 - 12.0 %    Eosinophil % 1.0 0.3 - 6.2 %    Basophil % 0.8 0.0 - 1.5 %    Immature Grans % 0.6 (H) 0.0 - 0.5 %    Neutrophils, Absolute 6.98 1.70 - 7.00 10*3/mm3    Lymphocytes, Absolute 2.69 0.70 - 3.10 10*3/mm3    Monocytes, Absolute 0.59 0.10 - 0.90 10*3/mm3    Eosinophils, Absolute 0.10 0.00 - 0.40 10*3/mm3    Basophils, Absolute 0.08 0.00 - 0.20 10*3/mm3    Immature Grans, Absolute 0.06 (H) 0.00 - 0.05 10*3/mm3    nRBC 0.0 0.0 - 0.2 /100 WBC   D-dimer, Quantitative    Specimen: Blood   Result Value Ref Range    D-Dimer, Quantitative <270 0 - 470 ng/mL (FEU)   Lipase    Specimen: Blood   Result Value Ref Range    Lipase 19 13 - 60 U/L   Green Top (Gel)   Result Value Ref Range    Extra Tube Hold for add-ons.    Lavender Top   Result Value Ref Range    Extra Tube hold for add-on    Gold Top - SST   Result Value Ref Range    Extra Tube Hold for add-ons.    Light Blue Top   Result Value Ref Range    Extra Tube hold for add-on      XR Chest 1 View    Result Date: 11/21/2021  Narrative: EXAM: XR CHEST 1 VIEW HISTORY: Chest pain protocol chest pain protocol COMPARISON: TECHNIQUE: Portable view of the chest. FINDINGS: The lungs are well aerated. There is no pleural effusion. The heart size is within normal limits. The mediastinal contours are within normal limits. . No evidence of focal consolidation. Unremarkable bronchovascular markings. Unremarkable bilateral hemidiaphragms. Unremarkable visualized osseous  structures.     Impression: 1.  There is no acute cardiopulmonary disease. Electronically signed by:  Obdulio Choi MD  11/21/2021 5:25 PM CST Workstation: [id number]                                         [facility]    Final diagnoses:   Chest pain, unspecified type       ED Disposition  ED Disposition     ED Disposition Condition Comment    Decision to Admit  Level of Care: Telemetry [5]   Diagnosis: Chest pain, unspecified type [[id number]]   Admitting Physician: AMERICA EWING [[id number]]   Attending Physician: AMERICA EWING [[id number]]            No follow-up provider specified.       Medication List      No changes were made to your prescriptions during this visit.          Samuel Newton PA-C  11/21/21 [id number]

## 2021-11-22 VITALS
RESPIRATION RATE: 18 BRPM | SYSTOLIC BLOOD PRESSURE: 134 MMHG | OXYGEN SATURATION: 97 % | WEIGHT: 259.8 LBS | DIASTOLIC BLOOD PRESSURE: 79 MMHG | HEART RATE: 73 BPM | TEMPERATURE: 97 F | BODY MASS INDEX: 43.28 KG/M2 | HEIGHT: 65 IN

## 2021-11-22 LAB
ANION GAP SERPL CALCULATED.3IONS-SCNC: 10 MMOL/L (ref 5–15)
BASOPHILS # BLD AUTO: 0.07 10*3/MM3 (ref 0–0.2)
BASOPHILS NFR BLD AUTO: 0.6 % (ref 0–1.5)
BUN SERPL-MCNC: 9 MG/DL (ref 6–20)
BUN/CREAT SERPL: 10.2 (ref 7–25)
CALCIUM SPEC-SCNC: 9.5 MG/DL (ref 8.6–10.5)
CHLORIDE SERPL-SCNC: 102 MMOL/L (ref 98–107)
CO2 SERPL-SCNC: 26 MMOL/L (ref 22–29)
CREAT SERPL-MCNC: 0.88 MG/DL (ref 0.57–1)
DEPRECATED RDW RBC AUTO: 37.7 FL (ref 37–54)
EOSINOPHIL # BLD AUTO: 0.06 10*3/MM3 (ref 0–0.4)
EOSINOPHIL NFR BLD AUTO: 0.5 % (ref 0.3–6.2)
ERYTHROCYTE [DISTWIDTH] IN BLOOD BY AUTOMATED COUNT: 13.1 % (ref 12.3–15.4)
GFR SERPL CREATININE-BSD FRML MDRD: 73 ML/MIN/1.73
GLUCOSE SERPL-MCNC: 124 MG/DL (ref 65–99)
HCT VFR BLD AUTO: 42 % (ref 34–46.6)
HGB BLD-MCNC: 14.7 G/DL (ref 12–15.9)
IMM GRANULOCYTES # BLD AUTO: 0.08 10*3/MM3 (ref 0–0.05)
IMM GRANULOCYTES NFR BLD AUTO: 0.7 % (ref 0–0.5)
LYMPHOCYTES # BLD AUTO: 2 10*3/MM3 (ref 0.7–3.1)
LYMPHOCYTES NFR BLD AUTO: 17.8 % (ref 19.6–45.3)
MAGNESIUM SERPL-MCNC: 2.2 MG/DL (ref 1.6–2.6)
MCH RBC QN AUTO: 28.3 PG (ref 26.6–33)
MCHC RBC AUTO-ENTMCNC: 35 G/DL (ref 31.5–35.7)
MCV RBC AUTO: 80.9 FL (ref 79–97)
MONOCYTES # BLD AUTO: 0.53 10*3/MM3 (ref 0.1–0.9)
MONOCYTES NFR BLD AUTO: 4.7 % (ref 5–12)
NEUTROPHILS NFR BLD AUTO: 75.7 % (ref 42.7–76)
NEUTROPHILS NFR BLD AUTO: 8.51 10*3/MM3 (ref 1.7–7)
NRBC BLD AUTO-RTO: 0 /100 WBC (ref 0–0.2)
PLATELET # BLD AUTO: 278 10*3/MM3 (ref 140–450)
PMV BLD AUTO: 9.6 FL (ref 6–12)
POTASSIUM SERPL-SCNC: 3.7 MMOL/L (ref 3.5–5.2)
RBC # BLD AUTO: 5.19 10*6/MM3 (ref 3.77–5.28)
SODIUM SERPL-SCNC: 138 MMOL/L (ref 136–145)
WBC NRBC COR # BLD: 11.25 10*3/MM3 (ref 3.4–10.8)

## 2021-11-22 PROCEDURE — 83735 ASSAY OF MAGNESIUM: CPT | Performed by: FAMILY MEDICINE

## 2021-11-22 PROCEDURE — 85025 COMPLETE CBC W/AUTO DIFF WBC: CPT | Performed by: FAMILY MEDICINE

## 2021-11-22 PROCEDURE — 80048 BASIC METABOLIC PNL TOTAL CA: CPT | Performed by: FAMILY MEDICINE

## 2021-11-22 PROCEDURE — G0378 HOSPITAL OBSERVATION PER HR: HCPCS

## 2021-11-22 PROCEDURE — 25010000002 ONDANSETRON PER 1 MG: Performed by: FAMILY MEDICINE

## 2021-11-22 PROCEDURE — 96376 TX/PRO/DX INJ SAME DRUG ADON: CPT

## 2021-11-22 RX ORDER — ACETAMINOPHEN 325 MG/1
650 TABLET ORAL EVERY 6 HOURS PRN
Status: DISCONTINUED | OUTPATIENT
Start: 2021-11-22 | End: 2021-11-22 | Stop reason: HOSPADM

## 2021-11-22 RX ADMIN — PAROXETINE HYDROCHLORIDE 20 MG: 20 TABLET, FILM COATED ORAL at 11:27

## 2021-11-22 RX ADMIN — ACETAMINOPHEN 650 MG: 325 TABLET, FILM COATED ORAL at 02:41

## 2021-11-22 RX ADMIN — ACETAMINOPHEN 650 MG: 325 TABLET, FILM COATED ORAL at 12:11

## 2021-11-22 RX ADMIN — ONDANSETRON 4 MG: 2 INJECTION INTRAMUSCULAR; INTRAVENOUS at 02:42

## 2021-11-22 RX ADMIN — SODIUM CHLORIDE, PRESERVATIVE FREE 10 ML: 5 INJECTION INTRAVENOUS at 09:17

## 2021-11-22 NOTE — PLAN OF CARE
Goal Outcome Evaluation:         Pt denies having chest pain this morning. Vital signs stable. Pt is being discharged.

## 2021-11-22 NOTE — H&P
[facility] Medicine Admission      Date of Admission: 2021      Primary Care Physician: Bull Logn MD      Chief Complaint: Chest pain    HPI:    36-year-old female with concurrent medical history of hypertension presenting to the hospital with chest pain.  Chest pain is substernal with radiation to the left side.  Started this evening and has some difficulty breathing at times.    Past Medical History:  has a past medical history of Acute pain, Acute pharyngitis, Aphthous ulcer of mouth, Glossitis, Hyperlipidemia, Hypertension, MVP (mitral valve prolapse), Streptococcal sore throat, and Upper respiratory infection.    Past Surgical History:  has a past surgical history that includes  section; Tubal ligation; Endometrial ablation (); Hysteroscopy (N/A, 2019); d & c with suction (N/A, 2019); laparoscopic assisted vaginal hysterectomy (N/A, 2019); Salpingectomy (Bilateral, 2019); and Oophorectomy (Left, 2019).    Family History: family history includes Arnold-Chiari malformation in her son; Asthma in her daughter; Cancer in her mother; Hypertension in her father; Leukemia in her mother; Multiple sclerosis in her father; No Known Problems in her brother, maternal grandfather, and maternal grandmother.    Social History:  reports that she has never smoked. She has never used smokeless tobacco. She reports that she does not drink alcohol and does not use drugs.    Allergies: No Known Allergies    Medications: Scheduled Meds:aspirin, 324 mg, Oral, Once  famotidine, 20 mg, Intravenous, Once  HYDROmorphone, 0.5 mg, Intravenous, Once  labetalol, 20 mg, Intravenous, Once  nitroglycerin, 1 inch, Topical, Q6H  PARoxetine, 20 mg, Oral, Daily  sodium chloride, 10 mL, Intravenous, Q12H      Continuous Infusions:   PRN Meds:.Morphine **AND** naloxone  •  ondansetron  •  sodium chloride  •  sodium chloride  Current Facility-Administered  Medications on File Prior to Encounter   Medication Dose Route Frequency Provider Last Rate Last Admin   • [COMPLETED] aspirin chewable tablet 324 mg  324 mg Oral Once Joan Covarrubias APRN   324 mg at 11/21/21 1558     Current Outpatient Medications on File Prior to Encounter   Medication Sig Dispense Refill   • clonazePAM (KlonoPIN) 0.5 MG tablet      • loratadine (CLARITIN) 10 MG tablet      • PARoxetine (PAXIL) 40 MG tablet      • [DISCONTINUED] PARoxetine (PAXIL) 20 MG tablet          Review of Systems:  Review of Systems   Respiratory: Positive for shortness of breath.    Cardiovascular: Positive for chest pain.      Otherwise complete ROS is negative except as mentioned above.    Physical Exam:   Temp:  [97.9 °F (36.6 °C)-98.1 °F (36.7 °C)] 98.1 °F (36.7 °C)  Heart Rate:  [] 86  Resp:  [20] 20  BP: (135-171)/() 155/93  Physical Exam  Vitals and nursing note reviewed.   Constitutional:       General: She is not in acute distress.     Appearance: She is well-developed. She is not diaphoretic.   HENT:      Head: Normocephalic and atraumatic.   Cardiovascular:      Rate and Rhythm: Normal rate.   Pulmonary:      Effort: Pulmonary effort is normal. No respiratory distress.      Breath sounds: No wheezing.   Abdominal:      General: There is no distension.      Palpations: Abdomen is soft.   Musculoskeletal:         General: Normal range of motion.   Skin:     General: Skin is warm and dry.   Neurological:      Mental Status: She is alert.      Cranial Nerves: No cranial nerve deficit.   Psychiatric:         Behavior: Behavior normal.         Thought Content: Thought content normal.         Judgment: Judgment normal.           Results Reviewed:  I have personally reviewed current lab, radiology, and data and agree with results.  Lab Results (last 24 hours)     Procedure Component Value Units Date/Time    COVID-19 and FLU A/B PCR - Swab, Nasopharynx [[id number]]  (Normal) Collected: 11/21/21 1900     Specimen: Swab from Nasopharynx Updated: 11/21/21 1926     COVID19 Not Detected     Influenza A PCR Not Detected     Influenza B PCR Not Detected    Narrative:      Fact sheet for providers: https://www.fda.gov/media/279504/download    Fact sheet for patients: https://www.fda.gov/media/795745/download    Test performed by PCR.    Troponin [[id number]]  (Normal) Collected: 11/21/21 1844    Specimen: Blood Updated: 11/21/21 1906     Troponin T <0.010 ng/mL     Narrative:      Troponin T Reference Range:  <= 0.03 ng/mL-   Negative for AMI  >0.03 ng/mL-     Abnormal for myocardial necrosis.  Clinicians would have to utilize clinical acumen, EKG, Troponin and serial changes to determine if it is an Acute Myocardial Infarction or myocardial injury due to an underlying chronic condition.       Results may be falsely decreased if patient taking Biotin.      [place] Draw [[id number]] Collected: 11/21/21 1653    Specimen: Blood Updated: 11/21/21 1800    Narrative:      The following orders were created for panel order [place] Draw.  Procedure                               Abnormality         Status                     ---------                               -----------         ------                     Green Top (Gel)[[id number]]                                  Final result               Lavender Top[[id number]]                                     Final result               Gold Top - SST[[id number]]                                   Final result               Light Blue Top[[id number]]                                   Final result                 Please view results for these tests on the individual orders.    Light Blue Top [[id number]] Collected: 11/21/21 1653    Specimen: Blood Updated: 11/21/21 1800     Extra Tube hold for add-on     Comment: Auto resulted       Green Top (Gel) [[id number]] Collected: 11/21/21 1653    Specimen: Blood Updated: 11/21/21 1800     Extra Tube Hold for add-ons.     Comment: Auto resulted.        Lavender Top [[id number]] Collected: 11/21/21 1654    Specimen: Blood Updated: 11/21/21 1800     Extra Tube hold for add-on     Comment: Auto resulted       Gold Top - SST [[id number]] Collected: 11/21/21 1653    Specimen: Blood Updated: 11/21/21 1800     Extra Tube Hold for add-ons.     Comment: Auto resulted.       Lipase [[id number]]  (Normal) Collected: 11/21/21 1653    Specimen: Blood Updated: 11/21/21 1722     Lipase 19 U/L     Comprehensive Metabolic Panel [[id number]] Collected: 11/21/21 1653    Specimen: Blood Updated: 11/21/21 1717     Glucose 99 mg/dL      BUN 7 mg/dL      Creatinine 0.83 mg/dL      Sodium 138 mmol/L      Potassium 3.6 mmol/L      Chloride 102 mmol/L      CO2 24.0 mmol/L      Calcium 9.5 mg/dL      Total Protein 8.0 g/dL      Albumin 4.20 g/dL      ALT (SGPT) 15 U/L      AST (SGOT) 16 U/L      Alkaline Phosphatase 111 U/L      Total Bilirubin 0.4 mg/dL      eGFR Non African Amer 78 mL/min/1.73      Globulin 3.8 gm/dL      A/G Ratio 1.1 g/dL      BUN/Creatinine Ratio 8.4     Anion Gap 12.0 mmol/L     Narrative:      GFR Normal >60  Chronic Kidney Disease <60  Kidney Failure <15      Troponin [[id number]]  (Normal) Collected: 11/21/21 1653    Specimen: Blood Updated: 11/21/21 1715     Troponin T <0.010 ng/mL     Narrative:      Troponin T Reference Range:  <= 0.03 ng/mL-   Negative for AMI  >0.03 ng/mL-     Abnormal for myocardial necrosis.  Clinicians would have to utilize clinical acumen, EKG, Troponin and serial changes to determine if it is an Acute Myocardial Infarction or myocardial injury due to an underlying chronic condition.       Results may be falsely decreased if patient taking Biotin.      BNP [[id number]]  (Normal) Collected: 11/21/21 1653    Specimen: Blood Updated: 11/21/21 1715     proBNP 30.4 pg/mL     Narrative:      Among patients with dyspnea, NT-proBNP is highly sensitive for the detection of acute congestive heart failure. In addition NT-proBNP of <300 pg/ml effectively  rules out acute congestive heart failure with 99% negative predictive value.    Results may be falsely decreased if patient taking Biotin.      D-dimer, Quantitative [[id number]]  (Normal) Collected: 11/21/21 1653    Specimen: Blood Updated: 11/21/21 1710     D-Dimer, Quantitative <270 ng/mL (FEU)     Narrative:      Dimer values <500 ng/ml FEU are FDA approved as aid in diagnosis of deep venous thrombosis and pulmonary embolism.  This test should not be used in an exclusion strategy with pretest probability alone.    A recent guideline regarding diagnosis for pulmonary thromboembolism recommends an adjusted exclusion criterion of age x 10 ng/ml FEU for patients >50 years of age ([employer] Intern Med 2015; 163: 701-711).      CBC & Differential [[id number]]  (Abnormal) Collected: 11/21/21 1654    Specimen: Blood Updated: 11/21/21 1658    Narrative:      The following orders were created for panel order CBC & Differential.  Procedure                               Abnormality         Status                     ---------                               -----------         ------                     CBC Auto Differential[[id number]]        Abnormal            Final result                 Please view results for these tests on the individual orders.    CBC Auto Differential [[id number]]  (Abnormal) Collected: 11/21/21 1654    Specimen: Blood Updated: 11/21/21 1658     WBC 10.50 10*3/mm3      RBC 5.27 10*6/mm3      Hemoglobin 15.0 g/dL      Hematocrit 43.0 %      MCV 81.6 fL      MCH 28.5 pg      MCHC 34.9 g/dL      RDW 13.0 %      RDW-SD 38.3 fl      MPV 9.7 fL      Platelets 316 10*3/mm3      Neutrophil % 66.4 %      Lymphocyte % 25.6 %      Monocyte % 5.6 %      Eosinophil % 1.0 %      Basophil % 0.8 %      Immature Grans % 0.6 %      Neutrophils, Absolute 6.98 10*3/mm3      Lymphocytes, Absolute 2.69 10*3/mm3      Monocytes, Absolute 0.59 10*3/mm3      Eosinophils, Absolute 0.10 10*3/mm3      Basophils, Absolute 0.08 10*3/mm3       Immature Grans, Absolute 0.06 10*3/mm3      nRBC 0.0 /100 WBC         Imaging Results (Last 24 Hours)     Procedure Component Value Units Date/Time    XR Chest 1 View [[id number]] Collected: 11/21/21 1642     Updated: 11/21/21 1726    Narrative:      EXAM: XR CHEST 1 VIEW    HISTORY: Chest pain protocol  chest pain protocol    COMPARISON:    TECHNIQUE:   Portable view of the chest.    FINDINGS:   The lungs are well aerated. There is no pleural effusion. The  heart size is within normal limits. The mediastinal contours are  within normal limits. .  No evidence of focal consolidation. Unremarkable bronchovascular  markings. Unremarkable bilateral hemidiaphragms.  Unremarkable visualized osseous structures.      Impression:      1.  There is no acute cardiopulmonary disease.          Electronically signed by:  Obdulio Choi MD  11/21/2021 5:25 PM CST  Workstation: [id number]            Assessment:    Active Hospital Problems    Diagnosis    • Chest pain              Chest pain-serial cardiac enzymes ordered.  Echocardiogram ordered.  Continue with telemetry monitoring.    Hypertension-continue to monitor    DVT prophylaxis-SCD    Patient is full code    I confirmed that the patient's Advance Care Plan is present, code status is documented, or surrogate decision maker is listed in the patient's medical record.       Romulo Baker MD  11/21/21  19:48 CST

## 2021-11-22 NOTE — DISCHARGE SUMMARY
[facility] Medicine Services  DISCHARGE SUMMARY       Date of Admission: 11/21/2021  Date of Discharge:  11/22/2021  Primary Care Physician: Bull Long MD    Presenting Problem/History of Present Illness:  Chest pain, unspecified type [R07.9]     Final Discharge Diagnoses:  Active Hospital Problems    Diagnosis    • Chest pain        Consults:   Consults     No orders found for last 30 day(s).          Procedures Performed:                 Pertinent Test Results:   Lab Results (most recent)     Procedure Component Value Units Date/Time    Basic Metabolic Panel [[id number]]  (Abnormal) Collected: 11/22/21 0550    Specimen: Blood Updated: 11/22/21 0638     Glucose 124 mg/dL      BUN 9 mg/dL      Creatinine 0.88 mg/dL      Sodium 138 mmol/L      Potassium 3.7 mmol/L      Chloride 102 mmol/L      CO2 26.0 mmol/L      Calcium 9.5 mg/dL      eGFR Non African Amer 73 mL/min/1.73      BUN/Creatinine Ratio 10.2     Anion Gap 10.0 mmol/L     Narrative:      GFR Normal >60  Chronic Kidney Disease <60  Kidney Failure <15      Magnesium [[id number]]  (Normal) Collected: 11/22/21 0550    Specimen: Blood Updated: 11/22/21 0638     Magnesium 2.2 mg/dL     CBC & Differential [[id number]]  (Abnormal) Collected: 11/22/21 0550    Specimen: Blood Updated: 11/22/21 0616    Narrative:      The following orders were created for panel order CBC & Differential.  Procedure                               Abnormality         Status                     ---------                               -----------         ------                     CBC Auto Differential[[id number]]        Abnormal            Final result                 Please view results for these tests on the individual orders.    CBC Auto Differential [[id number]]  (Abnormal) Collected: 11/22/21 0550    Specimen: Blood Updated: 11/22/21 0616     WBC 11.25 10*3/mm3      RBC 5.19 10*6/mm3      Hemoglobin 14.7 g/dL      Hematocrit 42.0 %      MCV  80.9 fL      MCH 28.3 pg      MCHC 35.0 g/dL      RDW 13.1 %      RDW-SD 37.7 fl      MPV 9.6 fL      Platelets 278 10*3/mm3      Neutrophil % 75.7 %      Lymphocyte % 17.8 %      Monocyte % 4.7 %      Eosinophil % 0.5 %      Basophil % 0.6 %      Immature Grans % 0.7 %      Neutrophils, Absolute 8.51 10*3/mm3      Lymphocytes, Absolute 2.00 10*3/mm3      Monocytes, Absolute 0.53 10*3/mm3      Eosinophils, Absolute 0.06 10*3/mm3      Basophils, Absolute 0.07 10*3/mm3      Immature Grans, Absolute 0.08 10*3/mm3      nRBC 0.0 /100 WBC     Troponin [[id number]]  (Normal) Collected: 11/21/21 2105    Specimen: Blood Updated: 11/21/21 2200     Troponin T <0.010 ng/mL     Narrative:      Troponin T Reference Range:  <= 0.03 ng/mL-   Negative for AMI  >0.03 ng/mL-     Abnormal for myocardial necrosis.  Clinicians would have to utilize clinical acumen, EKG, Troponin and serial changes to determine if it is an Acute Myocardial Infarction or myocardial injury due to an underlying chronic condition.       Results may be falsely decreased if patient taking Biotin.      CBC & Differential [[id number]]  (Abnormal) Collected: 11/21/21 2105    Specimen: Blood Updated: 11/21/21 2138    Narrative:      The following orders were created for panel order CBC & Differential.  Procedure                               Abnormality         Status                     ---------                               -----------         ------                     CBC Auto Differential[[id number]]        Abnormal            Final result                 Please view results for these tests on the individual orders.    CBC Auto Differential [[id number]]  (Abnormal) Collected: 11/21/21 2105    Specimen: Blood Updated: 11/21/21 2138     WBC 12.16 10*3/mm3      RBC 5.27 10*6/mm3      Hemoglobin 15.2 g/dL      Hematocrit 42.8 %      MCV 81.2 fL      MCH 28.8 pg      MCHC 35.5 g/dL      RDW 13.2 %      RDW-SD 38.2 fl      MPV 9.9 fL      Platelets 360 10*3/mm3       Neutrophil % 64.5 %      Lymphocyte % 28.0 %      Monocyte % 5.3 %      Eosinophil % 0.8 %      Basophil % 0.7 %      Immature Grans % 0.7 %      Neutrophils, Absolute 7.86 10*3/mm3      Lymphocytes, Absolute 3.40 10*3/mm3      Monocytes, Absolute 0.64 10*3/mm3      Eosinophils, Absolute 0.10 10*3/mm3      Basophils, Absolute 0.08 10*3/mm3      Immature Grans, Absolute 0.08 10*3/mm3      nRBC 0.0 /100 WBC     Basic Metabolic Panel [[id number]]  (Normal) Collected: 11/21/21 1844    Specimen: Blood Updated: 11/21/21 2015     Glucose 97 mg/dL      BUN 7 mg/dL      Creatinine 0.86 mg/dL      Sodium 137 mmol/L      Potassium 3.7 mmol/L      Chloride 101 mmol/L      CO2 24.0 mmol/L      Calcium 9.5 mg/dL      eGFR Non African Amer 75 mL/min/1.73      BUN/Creatinine Ratio 8.1     Anion Gap 12.0 mmol/L     Narrative:      GFR Normal >60  Chronic Kidney Disease <60  Kidney Failure <15      Magnesium [[id number]]  (Normal) Collected: 11/21/21 1844    Specimen: Blood Updated: 11/21/21 2015     Magnesium 2.0 mg/dL     COVID-19 and FLU A/B PCR - Swab, Nasopharynx [[id number]]  (Normal) Collected: 11/21/21 1900    Specimen: Swab from Nasopharynx Updated: 11/21/21 1926     COVID19 Not Detected     Influenza A PCR Not Detected     Influenza B PCR Not Detected    Narrative:      Fact sheet for providers: https://www.fda.gov/media/488181/download    Fact sheet for patients: https://www.fda.gov/media/412394/download    Test performed by PCR.    Troponin [[id number]]  (Normal) Collected: 11/21/21 1844    Specimen: Blood Updated: 11/21/21 1906     Troponin T <0.010 ng/mL     Narrative:      Troponin T Reference Range:  <= 0.03 ng/mL-   Negative for AMI  >0.03 ng/mL-     Abnormal for myocardial necrosis.  Clinicians would have to utilize clinical acumen, EKG, Troponin and serial changes to determine if it is an Acute Myocardial Infarction or myocardial injury due to an underlying chronic condition.       Results may be falsely decreased if  patient taking Biotin.      [place] Draw [[id number]] Collected: 11/21/21 1653    Specimen: Blood Updated: 11/21/21 1800    Narrative:      The following orders were created for panel order [place] Draw.  Procedure                               Abnormality         Status                     ---------                               -----------         ------                     Green Top (Gel)[[id number]]                                  Final result               Lavender Top[[id number]]                                     Final result               Gold Top - SST[[id number]]                                   Final result               Light Blue Top[[id number]]                                   Final result                 Please view results for these tests on the individual orders.    Light Blue Top [[id number]] Collected: 11/21/21 1653    Specimen: Blood Updated: 11/21/21 1800     Extra Tube hold for add-on     Comment: Auto resulted       Green Top (Gel) [[id number]] Collected: 11/21/21 1653    Specimen: Blood Updated: 11/21/21 1800     Extra Tube Hold for add-ons.     Comment: Auto resulted.       Lavender Top [[id number]] Collected: 11/21/21 1654    Specimen: Blood Updated: 11/21/21 1800     Extra Tube hold for add-on     Comment: Auto resulted       Gold Top - SST [[id number]] Collected: 11/21/21 1653    Specimen: Blood Updated: 11/21/21 1800     Extra Tube Hold for add-ons.     Comment: Auto resulted.       Lipase [[id number]]  (Normal) Collected: 11/21/21 1653    Specimen: Blood Updated: 11/21/21 1722     Lipase 19 U/L     Comprehensive Metabolic Panel [[id number]] Collected: 11/21/21 1653    Specimen: Blood Updated: 11/21/21 1717     Glucose 99 mg/dL      BUN 7 mg/dL      Creatinine 0.83 mg/dL      Sodium 138 mmol/L      Potassium 3.6 mmol/L      Chloride 102 mmol/L      CO2 24.0 mmol/L      Calcium 9.5 mg/dL      Total Protein 8.0 g/dL      Albumin 4.20 g/dL      ALT (SGPT) 15 U/L      AST (SGOT) 16 U/L       Alkaline Phosphatase 111 U/L      Total Bilirubin 0.4 mg/dL      eGFR Non African Amer 78 mL/min/1.73      Globulin 3.8 gm/dL      A/G Ratio 1.1 g/dL      BUN/Creatinine Ratio 8.4     Anion Gap 12.0 mmol/L     Narrative:      GFR Normal >60  Chronic Kidney Disease <60  Kidney Failure <15      BNP [[id number]]  (Normal) Collected: 11/21/21 1653    Specimen: Blood Updated: 11/21/21 1715     proBNP 30.4 pg/mL     Narrative:      Among patients with dyspnea, NT-proBNP is highly sensitive for the detection of acute congestive heart failure. In addition NT-proBNP of <300 pg/ml effectively rules out acute congestive heart failure with 99% negative predictive value.    Results may be falsely decreased if patient taking Biotin.      D-dimer, Quantitative [[id number]]  (Normal) Collected: 11/21/21 1653    Specimen: Blood Updated: 11/21/21 1710     D-Dimer, Quantitative <270 ng/mL (FEU)     Narrative:      Dimer values <500 ng/ml FEU are FDA approved as aid in diagnosis of deep venous thrombosis and pulmonary embolism.  This test should not be used in an exclusion strategy with pretest probability alone.    A recent guideline regarding diagnosis for pulmonary thromboembolism recommends an adjusted exclusion criterion of age x 10 ng/ml FEU for patients >50 years of age ([employer] Med 2015; 163: 701-711).          Imaging Results (Most Recent)     Procedure Component Value Units Date/Time    XR Chest 1 View [[id number]] Collected: 11/21/21 1642     Updated: 11/21/21 1726    Narrative:      EXAM: XR CHEST 1 VIEW    HISTORY: Chest pain protocol  chest pain protocol    COMPARISON:    TECHNIQUE:   Portable view of the chest.    FINDINGS:   The lungs are well aerated. There is no pleural effusion. The  heart size is within normal limits. The mediastinal contours are  within normal limits. .  No evidence of focal consolidation. Unremarkable bronchovascular  markings. Unremarkable bilateral hemidiaphragms.  Unremarkable visualized  "osseous structures.      Impression:      1.  There is no acute cardiopulmonary disease.          Electronically signed by:  Obdulio Choi MD  11/21/2021 5:25 PM CST  Workstation: [id number]          Chief Complaint on Day of Discharge: No complaints    Hospital Course:  The patient is a 36 y.o. female with history notable for hypertension who presented to [facility] with chest pain.  Patient felt that her chest pain may be related to some anxiety as her  recently passed away and she recently returned to work.  Patient notes her chest pain developed a couple of days prior to presenting to the hospital.  Patient reports that she is not having current symptoms or problems.  She was admitted for observation and serial troponins were negative and EKGs did not show any acute changes.  Options were discussed with the patient including further work-up as inpatient versus outpatient follow-up and she will follow up with PCP.  Patient was understanding agreement to the plan..      Condition on Discharge: Stable    Physical Exam on Discharge:  /79 (BP Location: Left arm, Patient Position: Lying)   Pulse 73   Temp 97 °F (36.1 °C) (Temporal)   Resp 18   Ht 165.1 cm (65\")   Wt 118 kg (259 lb 12.8 oz)   LMP 11/25/2019   SpO2 97%   BMI 43.23 kg/m²   Physical Exam  Constitutional:       General: She is not in acute distress.     Appearance: She is not toxic-appearing.   HENT:      Head: Normocephalic and atraumatic.      Right Ear: External ear normal.      Left Ear: External ear normal.      Nose: Nose normal.      Mouth/Throat:      Mouth: Mucous membranes are moist.      Pharynx: Oropharynx is clear.   Eyes:      Conjunctiva/sclera: Conjunctivae normal.   Cardiovascular:      Rate and Rhythm: Normal rate and regular rhythm.      Pulses: Normal pulses.      Heart sounds: Normal heart sounds.   Pulmonary:      Effort: Pulmonary effort is normal. No respiratory distress.      Breath sounds: " Normal breath sounds.   Abdominal:      General: Bowel sounds are normal.      Palpations: Abdomen is soft.      Tenderness: There is no abdominal tenderness.   Musculoskeletal:         General: No swelling.      Cervical back: Neck supple.   Skin:     General: Skin is warm.      Capillary Refill: Capillary refill takes less than 2 seconds.   Neurological:      General: No focal deficit present.      Mental Status: She is alert and oriented to person, place, and time. Mental status is at baseline.      Coordination: Coordination normal.   Psychiatric:         Mood and Affect: Mood normal.         Behavior: Behavior normal.         Discharge Disposition:  Home or Self Care    Discharge Medications:     Discharge Medications      Continue These Medications      Instructions Start Date   clonazePAM 0.5 MG tablet  Commonly known as: KlonoPIN   No dose, route, or frequency recorded.      loratadine 10 MG tablet  Commonly known as: CLARITIN   No dose, route, or frequency recorded.      PARoxetine 40 MG tablet  Commonly known as: PAXIL   No dose, route, or frequency recorded.      temazepam 7.5 MG capsule  Commonly known as: RESTORIL   7.5 mg, Oral, Nightly             Discharge Diet:   Diet Instructions     Diet: Regular      Discharge Diet: Regular          Activity at Discharge:   Activity Instructions     Activity as Tolerated            Discharge Care Plan/Instructions: Take medications as prescribed, follow-up with PCP, return for worsening symptoms.    Follow-up Appointments:   Future Appointments   Date Time Provider Department Center   3/2/2022  9:30 AM Raul Gloria MD [facility] None       Test Results Pending at Discharge: None    Mateo Morse MD    Time: 29 minutes

## 2021-11-23 ENCOUNTER — TRANSCRIBE ORDERS (OUTPATIENT)
Dept: CARDIOLOGY | Facility: CLINIC | Age: 36
End: 2021-11-23

## 2021-11-23 DIAGNOSIS — R03.0 ELEVATED BLOOD PRESSURE READING: ICD-10-CM

## 2021-11-23 DIAGNOSIS — R07.89 OTHER CHEST PAIN: Primary | ICD-10-CM

## 2021-11-24 LAB
QT INTERVAL: 364 MS
QT INTERVAL: 374 MS
QTC INTERVAL: 442 MS
QTC INTERVAL: 467 MS

## 2022-02-11 ENCOUNTER — OFFICE VISIT (OUTPATIENT)
Dept: CARDIOLOGY | Facility: CLINIC | Age: 37
End: 2022-02-11

## 2022-02-11 VITALS
HEART RATE: 90 BPM | TEMPERATURE: 97.8 F | SYSTOLIC BLOOD PRESSURE: 130 MMHG | OXYGEN SATURATION: 98 % | HEIGHT: 65 IN | WEIGHT: 283.2 LBS | BODY MASS INDEX: 47.18 KG/M2 | DIASTOLIC BLOOD PRESSURE: 90 MMHG

## 2022-02-11 DIAGNOSIS — Z01.810 PRE-OPERATIVE CARDIOVASCULAR EXAMINATION: ICD-10-CM

## 2022-02-11 DIAGNOSIS — I34.1 MVP (MITRAL VALVE PROLAPSE): ICD-10-CM

## 2022-02-11 DIAGNOSIS — R00.2 PALPITATIONS: Primary | ICD-10-CM

## 2022-02-11 PROCEDURE — 99214 OFFICE O/P EST MOD 30 MIN: CPT | Performed by: INTERNAL MEDICINE

## 2022-02-11 RX ORDER — METOPROLOL SUCCINATE 25 MG/1
TABLET, EXTENDED RELEASE ORAL
COMMUNITY
Start: 2022-01-24

## 2022-02-11 RX ORDER — QUETIAPINE FUMARATE 100 MG/1
TABLET, FILM COATED ORAL
COMMUNITY
Start: 2022-01-24

## 2022-02-11 NOTE — PROGRESS NOTES
Narda Felix Ray  36 y.o. female    2022     1. Palpitations    2. MVP (mitral valve prolapse)    3. Pre-operative cardiovascular examination        History of Present Illness:  Body mass index is 47.13 kg/m². BMI is above normal parameters. Recommendations include: exercise counseling, nutrition counseling and referral to primary care.    36 years old patient evaluated more than a year ago with history of mitral valve prolapse mild thickening with no significant regurgitation preserved left ventricular systolic function on echo 2018.  Previously evaluated by Dr. Hennessy.  No symptom of chest pain orthopnea PND lightheaded dizziness or intermittent claudication reported.  Unfortunately she lost her spouse and gained significant weight she was evaluated for leg band referred for preop evaluations.  She is currently on metoprolol.  She is tolerating very well with history of palpitations no further recurrence.  Recently she had history of walking almost 2 to 3 miles without symptom of shortness of breath dizziness chest pain or claudication pain previously have dilatation with a family history of cancer and tolerated the procedure very well          Total Cholesterol   0 - 199 mg/dL 221High     Triglycerides   20 - 199 mg/dL 191    HDL Cholesterol   60 - 200 mg/dL 38Low     LDL Cholesterol    1 - 129 mg/dL 136High     LDL/HDL Ratio   0.00 - 3.22 3.81High           SUBJECTIVE:    No Known Allergies      Past Medical History:   Diagnosis Date   • Acute pain    • Acute pharyngitis    • Aphthous ulcer of mouth    • Glossitis    • Hyperlipidemia    • Hypertension     no longer on medication   • MVP (mitral valve prolapse)    • Streptococcal sore throat    • Upper respiratory infection          Past Surgical History:   Procedure Laterality Date   •  SECTION      x 2   • D & C WITH SUCTION N/A 2019    Procedure: SUCTION DILATION AND CURETTAGE;  Surgeon: Evita Dominique MD;  Location: [facility]  OR;  Service: Obstetrics/Gynecology   • ENDOMETRIAL ABLATION  2013   • HYSTEROSCOPY N/A 4/19/2019    Procedure: HYSTEROSCOPY,;  Surgeon: Evita Dominique MD;  Location: [facility] OR;  Service: Obstetrics/Gynecology   • LAPAROSCOPIC ASSISTED VAGINAL HYSTERECTOMY N/A 12/9/2019    Procedure: TOTAL LAPAROSCOPIC HYSTERECTOMY;  Surgeon: Javier Turner DO;  Location: [facility] OR;  Service: Obstetrics/Gynecology   • OOPHORECTOMY Left 12/9/2019    Procedure: CYSTOSCOPY;  Surgeon: Javier Turner DO;  Location: [facility] OR;  Service: Obstetrics/Gynecology   • SALPINGECTOMY Bilateral 12/9/2019    Procedure: LEFT OOPHORECTOMY;  Surgeon: Javier Turner DO;  Location: [facility] OR;  Service: Obstetrics/Gynecology   • TUBAL ABDOMINAL LIGATION           Family History   Problem Relation Age of Onset   • Cancer Mother    • Leukemia Mother    • Hypertension Father    • Multiple sclerosis Father    • No Known Problems Brother    • Asthma Daughter    • Arnold-Chiari malformation Son    • No Known Problems Maternal Grandmother    • No Known Problems Maternal Grandfather          Social History     Socioeconomic History   • Marital status:    Tobacco Use   • Smoking status: Never Smoker   • Smokeless tobacco: Never Used   Vaping Use   • Vaping Use: Never used   Substance and Sexual Activity   • Alcohol use: No   • Drug use: No   • Sexual activity: Defer         Current Outpatient Medications   Medication Sig Dispense Refill   • clonazePAM (KlonoPIN) 0.5 MG tablet      • loratadine (CLARITIN) 10 MG tablet      • metoprolol succinate XL (TOPROL-XL) 25 MG 24 hr tablet      • PARoxetine (PAXIL) 40 MG tablet      • QUEtiapine (SEROquel) 100 MG tablet      • temazepam (RESTORIL) 7.5 MG capsule Take 7.5 mg by mouth Every Night.       No current facility-administered medications for this visit.           Review of Systems:     Constitutional:  Denies  change in exercise tolerance.     HENT:  Denies any hearing loss,  "epistaxis      Eyes: No blurring.    Respiratory: No exertional    Cardiovascular: See H&P    Gastrointestinal:  Denies change in bowel habits, dyspepsia    Endocrine: Negative for cold intolerance, heat intolerance, polydipsia    Genitourinary: No symptoms reported      Musculoskeletal: Denies  arthritic symptoms or back problems.     Skin:  Denies  rashes, or skin lesions.     Allergic/Immunologic: Negative.  Negative for environmental allergies,    Neurological:  Denies  , strokes, TIA, or seizure disorder.     Hematological: Denies any food allergies, seasonal allergies    Psychiatric/Behavioral: Denies any history of depression,      OBJECTIVE:    /90 (BP Location: Left arm, Patient Position: Sitting, Cuff Size: Adult)   Pulse 90   Temp 97.8 °F (36.6 °C)   Ht 165.1 cm (65\")   Wt 128 kg (283 lb 3.2 oz)   LMP 11/25/2019   SpO2 98%   BMI 47.13 kg/m²       Physical Exam:     Constitutional: Cooperative, alert and oriented, well-developed, well-nourished, in no acute distress.     HENT:   Head: Normocephalic atraumatic conjunctive is pink thyroid is nonpalpable no jugular is distention    Cardiovascular: Regular rhythm, S1 and S2 normal, no S3 or S4. Apical impulse not displaced. No murmurs    Pulmonary/Chest: Chest: No rales and wheezing    Abdominal: Abdomen soft, bowel sounds normoactive,    Musculoskeletal: Straight peripheral pulses no edema.     Neurological: No gross motor or sensory deficits noted,     Skin: Warm and dry to the touch, no apparent skin lesions     Psychiatric: She has a normal mood and affect. Her behavior is normal.         Procedures      Lab Results   Component Value Date    WBC 11.25 (H) 11/22/2021    HGB 14.7 11/22/2021    HCT 42.0 11/22/2021    MCV 80.9 11/22/2021     11/22/2021     Lab Results   Component Value Date    GLUCOSE 124 (H) 11/22/2021    BUN 9 11/22/2021    CREATININE 0.88 11/22/2021    EGFRIFNONA 73 11/22/2021    BCR 10.2 11/22/2021    CO2 26.0 " 11/22/2021    CALCIUM 9.5 11/22/2021    ALBUMIN 4.20 11/21/2021    AST 16 11/21/2021    ALT 15 11/21/2021     Lab Results   Component Value Date    CHOL 221 (H) 03/22/2019     Lab Results   Component Value Date    TRIG 191 03/22/2019     Lab Results   Component Value Date    HDL 38 (L) 03/22/2019     No components found for: LDLCALC  Lab Results   Component Value Date     (H) 03/22/2019     No results found for: HDLLDLRATIO  No components found for: CHOLHDL  Lab Results   Component Value Date    HGBA1C 5.5 03/22/2019     Lab Results   Component Value Date    TSH 1.620 03/22/2019           ASSESSMENT AND PLAN    #1 mitral valve prolapse with trace mitral mild tricuspid regurgitation    We will arrange an echocardiogram to reassess the mitral status and and tricuspid valve.      #2 palpitation with history of sinus tachycardia no further recurrence.  She is a pleased with clinical outcome.    #3  Preop evaluation    36 years old patient no signs of cardiac decompensation such as orthopnea PND chest pain or dizziness reported.  We will arrange an echocardiogram as discussed above and plain treadmill stress test.  Patient is a moderate risk for the proposed procedure.  Pros and cons discussed.  Patient is scheduled to undergo lap band    #3 prevention    Given the morbid obesity with BMI 47.  Significantly low carbohydrate, low-fat, DASH diet graded exercise discussed with the patient.  Patient is scheduled to undergo lap band surgery as a part of weight management    I spent 35 minutes caring for Narda on this date of service. This time includes time spent by me includes counseling/coordination of care as relates to the presenting problem and any ordered procedures/tests as outlined above.   Electronically signed by Raul Gloria MD, 02/11/22, 10:34 AM CST.          Diagnoses and all orders for this visit:    1. Palpitations (Primary)  -     Adult Transthoracic Echo Complete W/ Cont if Necessary Per  Protocol; Future    2. MVP (mitral valve prolapse)  -     Adult Transthoracic Echo Complete W/ Cont if Necessary Per Protocol; Future    3. Pre-operative cardiovascular examination  -     Treadmill Stress Test; Future          Raul Gloria MD  2/11/2022  10:30 CST

## 2022-04-19 ENCOUNTER — TELEPHONE (OUTPATIENT)
Dept: CARDIOLOGY | Facility: CLINIC | Age: 37
End: 2022-04-19

## 2022-04-19 NOTE — TELEPHONE ENCOUNTER
Contacted patient to inform her that per Dr. Gloria her treadmill stress test was normal. She voiced her understanding.

## 2022-08-30 ENCOUNTER — LAB REQUISITION (OUTPATIENT)
Dept: LAB | Facility: HOSPITAL | Age: 37
End: 2022-08-30

## 2022-08-30 DIAGNOSIS — D48.5 NEOPLASM OF UNCERTAIN BEHAVIOR OF SKIN: ICD-10-CM

## 2022-08-30 PROCEDURE — 87070 CULTURE OTHR SPECIMN AEROBIC: CPT | Performed by: NURSE PRACTITIONER

## 2022-08-30 PROCEDURE — 87147 CULTURE TYPE IMMUNOLOGIC: CPT | Performed by: NURSE PRACTITIONER

## 2022-08-30 PROCEDURE — 87186 SC STD MICRODIL/AGAR DIL: CPT | Performed by: NURSE PRACTITIONER

## 2022-08-30 PROCEDURE — 87205 SMEAR GRAM STAIN: CPT | Performed by: NURSE PRACTITIONER

## 2022-09-02 LAB
BACTERIA SPEC AEROBE CULT: ABNORMAL
GRAM STN SPEC: ABNORMAL
GRAM STN SPEC: ABNORMAL

## 2023-02-10 ENCOUNTER — OFFICE VISIT (OUTPATIENT)
Dept: CARDIOLOGY | Facility: CLINIC | Age: 38
End: 2023-02-10
Payer: COMMERCIAL

## 2023-02-10 VITALS
HEART RATE: 64 BPM | HEIGHT: 65 IN | DIASTOLIC BLOOD PRESSURE: 80 MMHG | SYSTOLIC BLOOD PRESSURE: 128 MMHG | OXYGEN SATURATION: 98 % | WEIGHT: 252 LBS | BODY MASS INDEX: 41.99 KG/M2

## 2023-02-10 DIAGNOSIS — E66.01 CLASS 3 SEVERE OBESITY DUE TO EXCESS CALORIES WITHOUT SERIOUS COMORBIDITY WITH BODY MASS INDEX (BMI) OF 40.0 TO 44.9 IN ADULT: ICD-10-CM

## 2023-02-10 DIAGNOSIS — R00.2 PALPITATIONS: Primary | ICD-10-CM

## 2023-02-10 PROCEDURE — 93000 ELECTROCARDIOGRAM COMPLETE: CPT | Performed by: INTERNAL MEDICINE

## 2023-02-10 PROCEDURE — 99213 OFFICE O/P EST LOW 20 MIN: CPT | Performed by: INTERNAL MEDICINE

## 2023-02-10 RX ORDER — PANTOPRAZOLE SODIUM 40 MG/1
TABLET, DELAYED RELEASE ORAL
COMMUNITY
Start: 2023-02-08

## 2023-02-10 NOTE — PROGRESS NOTES
Narda Felix Ray  37 y.o. female    2/10/2023     1. Palpitations    2. Class 3 severe obesity due to excess calories without serious comorbidity with body mass index (BMI) of 40.0 to 44.9 in adult (HCC)      3    Mitral regurg    History of Present Illness:  Body mass index is 41.93 kg/m². BMI is above normal parameters. Recommendations include: exercise counseling, nutrition counseling and referral to primary care.  37 years old patient presented today for routine follow-up s/p hysterectomy in November 2022 and also gastric sleeve in the same month last weight previous BMI was 47 today is 41.9.  She had concurrent medical problem of palpitation mild mitral regurgitation and questionable mitral valve prolapse.  Echocardiogram there is no definitive evidence of mitral valve prolapse and mild mitral regurgitation preserved left ventricle systolic function.  She has good functional capacity she remained physically active with no symptoms of cardiac decompensation such orthopnea PND chest pain or dizziness reported.      Echo April 2022    Left Ventricle Calculated left ventricular EF = 50% Estimated left ventricular EF = 61% Left ventricular ejection fraction appears to be 61 - 65%. Left ventricular systolic function is normal.   Normal left ventricular cavity size and wall thickness noted. Left ventricular diastolic function was normal.   Right Ventricle Normal right ventricular cavity size and systolic function noted. There is no evidence of a right ventricular thrombus present.   Left Atrium Normal left atrial size and volume noted. No evidence of a left atrial thrombus present.   Right Atrium Normal right atrial cavity size noted.   Aortic Valve The aortic valve is not well visualized. No significant aortic valve regurgitation is present. No hemodynamically significant aortic valve stenosis is present.   Mitral Valve The mitral valve is grossly normal in structure. Mild mitral valve regurgitation is present.    Tricuspid Valve Trace to mild tricuspid valve regurgitation is present.   Pulmonic Valve The pulmonic valve is not well visualized. There is no significant pulmonic valve regurgitation present.   Greater Vessels No dilation of the aortic root is present. No dilation of the sinuses of Valsalva is present. No dilation of the proximal aorta is present.         Stress test 2022  Clinical impression     #1Normal functions capacity with a low risk study     #2 No ST-T wave changes suggesting ischemia     #3 No Arrhythmia noted          Total Cholesterol   0 - 199 mg/dL 221High     Triglycerides   20 - 199 mg/dL 191    HDL Cholesterol   60 - 200 mg/dL 38Low     LDL Cholesterol    1 - 129 mg/dL 136High     LDL/HDL Ratio   0.00 - 3.22 3.81High           SUBJECTIVE:    No Known Allergies      Past Medical History:   Diagnosis Date   • Acute pain    • Acute pharyngitis    • Aphthous ulcer of mouth    • Glossitis    • Hyperlipidemia    • Hypertension     no longer on medication   • MVP (mitral valve prolapse)    • Streptococcal sore throat    • Upper respiratory infection          Past Surgical History:   Procedure Laterality Date   •  SECTION      x 2   • D & C WITH SUCTION N/A 2019    Procedure: SUCTION DILATION AND CURETTAGE;  Surgeon: Evita Dominique MD;  Location: [facility];  Service: Obstetrics/Gynecology   • ENDOMETRIAL ABLATION     • HYSTEROSCOPY N/A 2019    Procedure: HYSTEROSCOPY,;  Surgeon: Evita Dominique MD;  Location: [facility] OR;  Service: Obstetrics/Gynecology   • LAPAROSCOPIC ASSISTED VAGINAL HYSTERECTOMY N/A 2019    Procedure: TOTAL LAPAROSCOPIC HYSTERECTOMY;  Surgeon: Javier Turner DO;  Location: [facility] OR;  Service: Obstetrics/Gynecology   • OOPHORECTOMY Left 2019    Procedure: CYSTOSCOPY;  Surgeon: Javier Turner DO;  Location: [facility] OR;  Service: Obstetrics/Gynecology   • SALPINGECTOMY Bilateral 2019    Procedure: LEFT OOPHORECTOMY;   Surgeon: Javier Turner DO;  Location: [facility];  Service: Obstetrics/Gynecology   • TUBAL ABDOMINAL LIGATION           Family History   Problem Relation Age of Onset   • Cancer Mother    • Leukemia Mother    • Hypertension Father    • Multiple sclerosis Father    • No Known Problems Brother    • Asthma Daughter    • Arnold-Chiari malformation Son    • No Known Problems Maternal Grandmother    • No Known Problems Maternal Grandfather          Social History     Socioeconomic History   • Marital status:    Tobacco Use   • Smoking status: Never   • Smokeless tobacco: Never   Vaping Use   • Vaping Use: Never used   Substance and Sexual Activity   • Alcohol use: No   • Drug use: No   • Sexual activity: Defer         Current Outpatient Medications   Medication Sig Dispense Refill   • clonazePAM (KlonoPIN) 0.5 MG tablet      • loratadine (CLARITIN) 10 MG tablet      • metoprolol succinate XL (TOPROL-XL) 25 MG 24 hr tablet      • pantoprazole (PROTONIX) 40 MG EC tablet      • PARoxetine (PAXIL) 40 MG tablet      • QUEtiapine (SEROquel) 100 MG tablet        No current facility-administered medications for this visit.           Review of Systems:     Constitutional:  Denies  change in exercise tolerance.     HENT:  Denies any hearing loss, epistaxis      Eyes: No blurring.    Respiratory: No exertional    Cardiovascular: See H&P    Gastrointestinal:  Denies change in bowel habits, dyspepsia    Endocrine: Negative for cold intolerance, heat intolerance, polydipsia    Genitourinary: No symptoms reported      Musculoskeletal: Denies  arthritic symptoms or back problems.     Skin:  Denies  rashes, or skin lesions.     Allergic/Immunologic: Negative.  Negative for environmental allergies,    Neurological:  Denies  , strokes, TIA, or seizure disorder.     Hematological: Denies any food allergies, seasonal allergies    Psychiatric/Behavioral: Denies any history of depression,      OBJECTIVE:    /80 (BP  "Location: Left arm, Patient Position: Sitting, Cuff Size: Adult)   Pulse 64   Ht 165.1 cm (65\")   Wt 114 kg (252 lb)   LMP 11/25/2019   SpO2 98%   BMI 41.93 kg/m²       Physical Exam:     Constitutional: Cooperative, alert and oriented, well-developed, well-nourished, in no acute distress.     HENT:   Head: Normocephalic atraumatic conjunctive is pink thyroid is nonpalpable no jugular is distention    Cardiovascular: Regular rhythm, S1 and S2 normal, no S3 or S4. Apical impulse not displaced. No murmurs    Pulmonary/Chest: Chest: No rales and wheezing    Abdominal: Abdomen soft, bowel sounds normoactive,    Musculoskeletal: Straight peripheral pulses no edema.     Neurological: No gross motor or sensory deficits noted,     Skin: Warm and dry to the touch, no apparent skin lesions     Psychiatric: She has a normal mood and affect. Her behavior is normal.         Procedures      Lab Results   Component Value Date    WBC 11.25 (H) 11/22/2021    HGB 14.7 11/22/2021    HCT 42.0 11/22/2021    MCV 80.9 11/22/2021     11/22/2021     Lab Results   Component Value Date    GLUCOSE 124 (H) 11/22/2021    BUN 9 11/22/2021    CREATININE 0.88 11/22/2021    EGFRIFNONA 73 11/22/2021    BCR 10.2 11/22/2021    CO2 26.0 11/22/2021    CALCIUM 9.5 11/22/2021    ALBUMIN 4.20 11/21/2021    AST 16 11/21/2021    ALT 15 11/21/2021     Lab Results   Component Value Date    CHOL 221 (H) 03/22/2019     Lab Results   Component Value Date    TRIG 191 03/22/2019     Lab Results   Component Value Date    HDL 38 (L) 03/22/2019     No components found for: LDLCALC  Lab Results   Component Value Date     (H) 03/22/2019     No results found for: HDLLDLRATIO  No components found for: CHOLHDL  Lab Results   Component Value Date    HGBA1C 5.5 03/22/2019     Lab Results   Component Value Date    TSH 1.620 03/22/2019           ASSESSMENT AND PLAN    #1  Mitral regurgitation  Echocardiogram no definitive evidence of mitral valve prolapse " there is mild mitral regurgitation we will continue clinical surveillance.    We will arrange an echocardiogram to reassess the mitral status and and tricuspid valve.      #2 palpitation with history of sinus tachycardia no further recurrence.  She is a pleased with clinical outcome.  Continue Toprol          #3 prevention  Class III severe obesity with BMI of 41-44  .  Significantly low carbohydrate, low-fat, DASH diet graded exercise discussed with the patient.  Patient is scheduled to undergo lap band surgery as a part of weight management    I spent 16 minutes caring for Narda on this date of service. This time includes time spent by me of counseling/coordination of care as relates to the presenting problem and any ordered procedures/tests as outlined above.                 This document has been electronically signed by Raul Gloria MD on February 10, 2023 10:58 CST      Diagnoses and all orders for this visit:    1. Palpitations (Primary)  -     ECG 12 Lead    2. Class 3 severe obesity due to excess calories without serious comorbidity with body mass index (BMI) of 40.0 to 44.9 in adult (HCC)          Raul Gloria MD  2/10/2023  10:51 CST

## 2023-02-15 LAB
QT INTERVAL: 434 MS
QTC INTERVAL: 447 MS

## 2023-07-24 ENCOUNTER — TRANSCRIBE ORDERS (OUTPATIENT)
Dept: NUCLEAR MEDICINE | Facility: HOSPITAL | Age: 38
End: 2023-07-24
Payer: COMMERCIAL

## 2023-07-24 DIAGNOSIS — R10.13 EPIGASTRIC PAIN: Primary | ICD-10-CM

## (undated) DEVICE — CANN VAC GYN VACURETTE BERKELEY CRV 8MM 1P/U STRL

## (undated) DEVICE — PREP PVP-I 7.5P BT 4OZ

## (undated) DEVICE — PREP SOL POVIDONE/IODINE BT 4OZ

## (undated) DEVICE — CP SLF SEAL HYSTERSCOPE 2MM/HL

## (undated) DEVICE — GLV SURG TRIUMPH LT PF LTX 7.5 STRL

## (undated) DEVICE — SKIN AFFIX SURG ADHESIVE 72/CS 0.55ML: Brand: MEDLINE

## (undated) DEVICE — GLV SURG SENSICARE PI LF PF 8 GRN STRL

## (undated) DEVICE — GOWN,AURORA,NOREINF,RAGLAN,XL,STERILE: Brand: MEDLINE

## (undated) DEVICE — TUBING, SUCTION, 3/16" X 6', STRAIGHT: Brand: MEDLINE

## (undated) DEVICE — PK D AND C 60

## (undated) DEVICE — ENDOPATH XCEL BLADELESS TROCARS WITH STABILITY SLEEVES: Brand: ENDOPATH XCEL

## (undated) DEVICE — APPL CHLORAPREP W/TINT 26ML ORNG

## (undated) DEVICE — TBG INSUFL PUR-FLO W/.1 MICRON ULPA FLTR LF

## (undated) DEVICE — APPL HEMOS FOR DELIVERY FLOSEAL

## (undated) DEVICE — COUNT NDL FOAM STRIP W/MAG 60CT

## (undated) DEVICE — GLV SURG TRIUMPH NATURAL W/ALOE PF LTX 7 STRL

## (undated) DEVICE — SYR LL TP 10ML STRL

## (undated) DEVICE — SUT NLY 2/0 664G

## (undated) DEVICE — SUTURING DEVICE: Brand: ENDO STITCH

## (undated) DEVICE — CATH FOL LUBRISIL IC 3WY 18F 5CC

## (undated) DEVICE — METER,URINE,400ML,DRAIN BAG,L/F,LL,SLIDE: Brand: MEDLINE

## (undated) DEVICE — ENDOPATH 5MM ENDOSCOPIC BLUNT TIP DISSECTORS (12 POUCHES CONTAINING 3 DISSECTORS EACH): Brand: ENDOPATH

## (undated) DEVICE — SUT MONOCRYL 4/0 PS2 27IN Y426H ETY426H

## (undated) DEVICE — PK LAP GYN 60

## (undated) DEVICE — OCCL COLPO PNEUMO  STRL

## (undated) DEVICE — DRN WND FLT 90D HUBLSS FULL TROC 7MM LF

## (undated) DEVICE — SOL IRRIG NACL 1000ML

## (undated) DEVICE — CYSTO/BLADDER IRRIGATION SET, REGULATING CLAMP

## (undated) DEVICE — SUT VIC 0 CT1 36IN J946H

## (undated) DEVICE — TOTAL TRAY, 16FR 10ML SIL FOLEY, URN: Brand: MEDLINE

## (undated) DEVICE — MONOPOLAR METZENBAUM SCISSOR TIP, DISPOSABLE: Brand: MONOPOLAR METZENBAUM SCISSOR TIP, DISPOSABLE

## (undated) DEVICE — GAUZE,SPONGE,4"X4",16PLY,XRAY,STRL,LF: Brand: MEDLINE

## (undated) DEVICE — SOL IRR NACL 0.9PCT BT 1000ML

## (undated) DEVICE — GLV SURG TRIUMPH PF LTX 5.5 STRL

## (undated) DEVICE — GLV SURG SENSICARE POLYISPRN W/ALOE PF LF 6.5 GRN STRL

## (undated) DEVICE — NDL HYPO SFTY GLD 22G 1 1/2IN

## (undated) DEVICE — GLV SURG SENSICARE PI PF LF 7 GRN STRL

## (undated) DEVICE — SOL IRR NACL 0.9PCT 3000ML

## (undated) DEVICE — GOWN,PREVENTION PLUS,XLNG/XXLARGE,STRL: Brand: MEDLINE

## (undated) DEVICE — NDL SPINAL QUINCKE 22G 3IN BLK

## (undated) DEVICE — UNDRPD BREATH 23X36 BG/10

## (undated) DEVICE — ENSEAL LAPAROSCOPIC TISSUE SEALER G2 ARTICULATING  CURVED JAW FOR USE WITH G2 GENERATOR 5MM DIAMETER 35CM SHAFT LENGTH: Brand: ENSEAL

## (undated) DEVICE — BNDG ADHS CURAD FLX/FABRC 2X4IN STRL LF

## (undated) DEVICE — RESERVOIR,SUCTION,100CC,SILICONE: Brand: MEDLINE

## (undated) DEVICE — ST COL BERKELY TBG

## (undated) DEVICE — SYS CLS PORTSITE CT CLOSESURE 5AND10/12

## (undated) DEVICE — SUT MERSILENE 5MM ETHD9212

## (undated) DEVICE — STERILE POLYISOPRENE POWDER-FREE SURGICAL GLOVES WITH EMOLLIENT COATING: Brand: PROTEXIS

## (undated) DEVICE — ENDOPATH PNEUMONEEDLE INSUFFLATION NEEDLES WITH LUER LOCK CONNECTORS 120MM: Brand: ENDOPATH

## (undated) DEVICE — SOL IRRIG H2O 1000ML STRL

## (undated) DEVICE — DRP SURG U/BUTT W/PCH BACK STRL

## (undated) DEVICE — CORE TRUMPET FOR SINGLE SOLUTION BAG: Brand: CORE DYNAMICS